# Patient Record
Sex: FEMALE | Race: WHITE | NOT HISPANIC OR LATINO | Employment: STUDENT | URBAN - METROPOLITAN AREA
[De-identification: names, ages, dates, MRNs, and addresses within clinical notes are randomized per-mention and may not be internally consistent; named-entity substitution may affect disease eponyms.]

---

## 2023-03-02 ENCOUNTER — TELEPHONE (OUTPATIENT)
Dept: NEUROLOGY | Facility: CLINIC | Age: 18
End: 2023-03-02
Payer: COMMERCIAL

## 2023-03-02 DIAGNOSIS — F07.81 POST-CONCUSSION SYNDROME: Primary | ICD-10-CM

## 2023-03-02 NOTE — TELEPHONE ENCOUNTER
----- Message from Bharath Perrin MA sent at 2/28/2023  4:48 PM CST -----  Regarding: FW: Patient call back    ----- Message -----  From: Grace Osborne MA  Sent: 2/28/2023   2:21 PM CST  To: Alena Mata, Nathalia Mack Staff  Subject: FW: Patient call back                              ----- Message -----  From: Sonal Dyer MD  Sent: 2/27/2023  11:31 AM CST  To: Grace Osborne MA  Subject: RE: Patient call back                            PCS is best served by Marcos Alena or Nathalia.  kS  ----- Message -----  From: Grace Osborne MA  Sent: 2/16/2023   1:43 PM CST  To: Sonla Dyer MD  Subject: FW: Patient call back                              ----- Message -----  From: Tasha Gray  Sent: 2/16/2023   1:08 PM CST  To: Gomez Pruitt Staff  Subject: Patient call back                                Who called:J LUIS JINA [[id number]]    What is the request in detail: Patient is requesting a call back.  She states she is having post concussion symptoms and would like to schedule an appt.   Please advise.    Can the clinic reply by [facility]? No    Best call back number: [phone]    Additional Information: N/A

## 2023-03-09 ENCOUNTER — OFFICE VISIT (OUTPATIENT)
Dept: NEUROLOGY | Facility: CLINIC | Age: 18
End: 2023-03-09
Payer: COMMERCIAL

## 2023-03-09 ENCOUNTER — CLINICAL SUPPORT (OUTPATIENT)
Dept: REHABILITATION | Facility: HOSPITAL | Age: 18
End: 2023-03-09
Attending: PSYCHIATRY & NEUROLOGY
Payer: COMMERCIAL

## 2023-03-09 VITALS
SYSTOLIC BLOOD PRESSURE: 116 MMHG | DIASTOLIC BLOOD PRESSURE: 75 MMHG | WEIGHT: 126 LBS | BODY MASS INDEX: 20.99 KG/M2 | HEART RATE: 85 BPM | HEIGHT: 65 IN

## 2023-03-09 DIAGNOSIS — H51.9 CONVERGENCE INSUFFICIENCY OR PALSY IN BINOCULAR EYE MOVEMENT: ICD-10-CM

## 2023-03-09 DIAGNOSIS — F07.81 POST-CONCUSSION SYNDROME: ICD-10-CM

## 2023-03-09 DIAGNOSIS — G31.89 COGNITIVE AND NEUROBEHAVIORAL DYSFUNCTION FOLLOWING BRAIN INJURY: ICD-10-CM

## 2023-03-09 DIAGNOSIS — F07.81 POST-CONCUSSION SYNDROME: Primary | ICD-10-CM
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
## 2023-03-09 DIAGNOSIS — F09 COGNITIVE AND NEUROBEHAVIORAL DYSFUNCTION FOLLOWING BRAIN INJURY: ICD-10-CM

## 2023-03-09 DIAGNOSIS — S06.9XAS COGNITIVE AND NEUROBEHAVIORAL DYSFUNCTION FOLLOWING BRAIN INJURY: ICD-10-CM

## 2023-03-09 DIAGNOSIS — S06.0X1A CONCUSSION WITH LOSS OF CONSCIOUSNESS OF 30 MINUTES OR LESS, INITIAL ENCOUNTER: ICD-10-CM

## 2023-03-09 DIAGNOSIS — G43.709 CHRONIC MIGRAINE WITHOUT AURA WITHOUT STATUS MIGRAINOSUS, NOT INTRACTABLE: ICD-10-CM

## 2023-03-09 DIAGNOSIS — F07.81 POST CONCUSSION SYNDROME: Primary | ICD-10-CM

## 2023-03-09 DIAGNOSIS — H81.90 VESTIBULOPATHY, UNSPECIFIED LATERALITY: ICD-10-CM

## 2023-03-09 DIAGNOSIS — G43.901 STATUS MIGRAINOSUS: ICD-10-CM

## 2023-03-09 DIAGNOSIS — G44.86 CERVICOGENIC HEADACHE: ICD-10-CM

## 2023-03-09 DIAGNOSIS — S13.4XXA WHIPLASH, INITIAL ENCOUNTER: ICD-10-CM

## 2023-03-09 DIAGNOSIS — R29.898 DECREASED ROM OF NECK: ICD-10-CM

## 2023-03-09 PROCEDURE — 1160F PR REVIEW ALL MEDS BY PRESCRIBER/CLIN PHARMACIST DOCUMENTED: ICD-10-PCS | Mod: CPTII,S$GLB,, | Performed by: PSYCHIATRY & NEUROLOGY

## 2023-03-09 PROCEDURE — 3074F SYST BP LT 130 MM HG: CPT | Mod: CPTII,S$GLB,, | Performed by: PSYCHIATRY & NEUROLOGY

## 2023-03-09 PROCEDURE — 3078F DIAST BP <80 MM HG: CPT | Mod: CPTII,S$GLB,, | Performed by: PSYCHIATRY & NEUROLOGY

## 2023-03-09 PROCEDURE — 99205 PR OFFICE/OUTPT VISIT, NEW, LEVL V, 60-74 MIN: ICD-10-PCS | Mod: S$GLB,,, | Performed by: PSYCHIATRY & NEUROLOGY

## 2023-03-09 PROCEDURE — 99999 PR PBB SHADOW E&M-EST. PATIENT-LVL III: ICD-10-PCS | Mod: PBBFAC,,,

## 2023-03-09 PROCEDURE — 1160F RVW MEDS BY RX/DR IN RCRD: CPT | Mod: CPTII,S$GLB,, | Performed by: PSYCHIATRY & NEUROLOGY

## 2023-03-09 PROCEDURE — 3008F PR BODY MASS INDEX (BMI) DOCUMENTED: ICD-10-PCS | Mod: CPTII,S$GLB,, | Performed by: PSYCHIATRY & NEUROLOGY

## 2023-03-09 PROCEDURE — 3008F BODY MASS INDEX DOCD: CPT | Mod: CPTII,S$GLB,, | Performed by: PSYCHIATRY & NEUROLOGY

## 2023-03-09 PROCEDURE — 1159F MED LIST DOCD IN RCRD: CPT | Mod: CPTII,S$GLB,, | Performed by: PSYCHIATRY & NEUROLOGY

## 2023-03-09 PROCEDURE — 3078F PR MOST RECENT DIASTOLIC BLOOD PRESSURE < 80 MM HG: ICD-10-PCS | Mod: CPTII,S$GLB,, | Performed by: PSYCHIATRY & NEUROLOGY

## 2023-03-09 PROCEDURE — 99999 PR PBB SHADOW E&M-EST. PATIENT-LVL III: CPT | Mod: PBBFAC,,,

## 2023-03-09 PROCEDURE — 97161 PT EVAL LOW COMPLEX 20 MIN: CPT | Performed by: PHYSICAL THERAPIST

## 2023-03-09 PROCEDURE — 1159F PR MEDICATION LIST DOCUMENTED IN MEDICAL RECORD: ICD-10-PCS | Mod: CPTII,S$GLB,, | Performed by: PSYCHIATRY & NEUROLOGY

## 2023-03-09 PROCEDURE — 97110 THERAPEUTIC EXERCISES: CPT | Performed by: PHYSICAL THERAPIST

## 2023-03-09 PROCEDURE — 3074F PR MOST RECENT SYSTOLIC BLOOD PRESSURE < 130 MM HG: ICD-10-PCS | Mod: CPTII,S$GLB,, | Performed by: PSYCHIATRY & NEUROLOGY

## 2023-03-09 PROCEDURE — 97530 THERAPEUTIC ACTIVITIES: CPT

## 2023-03-09 PROCEDURE — 99205 OFFICE O/P NEW HI 60 MIN: CPT | Mod: S$GLB,,, | Performed by: PSYCHIATRY & NEUROLOGY

## 2023-03-09 PROCEDURE — 97165 OT EVAL LOW COMPLEX 30 MIN: CPT

## 2023-03-09 RX ORDER — SUMATRIPTAN 20 MG/1
SPRAY NASAL
COMMUNITY
Start: 2023-02-26 | End: 2023-03-18 | Stop reason: SDUPTHER

## 2023-03-09 RX ORDER — PREDNISONE 5 MG/1
TABLET ORAL
Qty: 21 TABLET | Refills: 0 | Status: SHIPPED | OUTPATIENT
Start: 2023-03-09 | End: 2023-03-15

## 2023-03-09 RX ORDER — SUMATRIPTAN 50 MG/1
50 TABLET, FILM COATED ORAL
COMMUNITY
End: 2023-03-20
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
## 2023-03-09 NOTE — PROGRESS NOTES
See plan of care for physical therapy evaluation    Chelo Sanchez, PT, DPT,   Board-Certified Clinical Specialist in Neurologic Physical Therapy   Certified Brain Injury Specialist
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
## 2023-03-09 NOTE — PATIENT INSTRUCTIONS
Choose 3 targets in the distance (alternate between targets that are large and small) and change gaze between all 3, as fast as you can but ensuring that each comes into focus.     Hold a near target (about arms length) in front of you and choose a far target. Change gaze between near and far targets as fast as you can but ensuring that each comes into focus.     Perform each of these 3-4 times a day for at least a minute at a time.         Retrieved from:   [email or web address]/hcp-hs-yshhg-digital-eye-strain/    To help avoid workplace dry eye and eye strain, follow these eye ergonomics tips from the American Academy of Ophthalmology:    - Stay at arms length: The eyes actually have to work harder to see close up than far away. If you have a desktop computer, try placing the monitor 25 inches away from your face. No measuring tape? Put your screen an arms length away. You may need to adjust the type to appear larger at that distance.  - Take care of glare: While many new phones and laptops have glass screens with excellent picture quality, they also produce a strong glare that can aggravate the eyes. If you use a glass screen device, try a matte filter for your screen.  - Give your eyes a break: Just as carpal tunnel syndrome from overuse can hurt your wrists, eye strain occurs after long, continuous periods of reading paper or viewing digital screens up close. Follow the 20-20-20 rule: take a break every 20 minutes by looking at an object 20 feet away for 20 seconds. Looking into the distance allows your eyes to relax.  - Defy dry eye: Many newer office buildings have humidity-controlled environments that suck moisture out of the air. In winter, heaters on high can further dry your eyes. Try a desktop humidifier to add localized moisture.[3] Keep artificial tears at hand to help lubricate your eyes.  - Lighten up: When your screen is much brighter than your surroundings, your eyes have to work harder  to see. Adjusting your environmental lighting can reduce eye strain. Also, try increasing the contrast on your monitor.    Retrieved from: https://www.aao.org/newsroom/news-releases/detail/ergonomics-eyes-tips-avoid-computer-eye-strai#_edn3    *Keep a journal about how long you are tolerating screen time. Gradually progress/increase time intervals and number of times you use the computer a day. Make sure progressions are easy for at least 3 days before you make another progression. Continue to progress until you return to prior level of function.
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
## 2023-03-14 ENCOUNTER — HOSPITAL ENCOUNTER (EMERGENCY)
Facility: OTHER | Age: 18
Discharge: HOME | End: 2023-03-14
Attending: EMERGENCY MEDICINE
Payer: COMMERCIAL

## 2023-03-14 VITALS
BODY MASS INDEX: 20.83 KG/M2 | TEMPERATURE: 98 F | HEIGHT: 65 IN | RESPIRATION RATE: 18 BRPM | SYSTOLIC BLOOD PRESSURE: 105 MMHG | WEIGHT: 125 LBS | DIASTOLIC BLOOD PRESSURE: 67 MMHG | HEART RATE: 72 BPM | OXYGEN SATURATION: 98 %

## 2023-03-14 DIAGNOSIS — R51.9 CHRONIC NONINTRACTABLE HEADACHE, UNSPECIFIED HEADACHE TYPE: ICD-10-CM

## 2023-03-14 DIAGNOSIS — G89.29 CHRONIC NONINTRACTABLE HEADACHE, UNSPECIFIED HEADACHE TYPE: ICD-10-CM

## 2023-03-14 DIAGNOSIS — F07.81 POST CONCUSSION SYNDROME: Primary | ICD-10-CM

## 2023-03-14 LAB
AMORPH CRY URNS QL MICRO: ABNORMAL
B-HCG UR QL: NEGATIVE
BACTERIA #/AREA URNS HPF: ABNORMAL /HPF
BILIRUB UR QL STRIP: NEGATIVE
CLARITY UR: CLEAR
COLOR UR: YELLOW
CTP QC/QA: YES
GLUCOSE UR QL STRIP: NEGATIVE
HGB UR QL STRIP: NEGATIVE
HYALINE CASTS #/AREA URNS LPF: 1 /LPF
KETONES UR QL STRIP: ABNORMAL
LEUKOCYTE ESTERASE UR QL STRIP: NEGATIVE
MICROSCOPIC COMMENT: ABNORMAL
NITRITE UR QL STRIP: NEGATIVE
PH UR STRIP: 5 [PH] (ref 5–8)
PROT UR QL STRIP: ABNORMAL
RBC #/AREA URNS HPF: 3 /HPF (ref 0–4)
SP GR UR STRIP: >1.03 (ref 1–1.03)
SQUAMOUS #/AREA URNS HPF: 1 /HPF
URN SPEC COLLECT METH UR: ABNORMAL
UROBILINOGEN UR STRIP-ACNC: NEGATIVE EU/DL
WBC #/AREA URNS HPF: 2 /HPF (ref 0–5)

## 2023-03-14 PROCEDURE — 96375 TX/PRO/DX INJ NEW DRUG ADDON: CPT

## 2023-03-14 PROCEDURE — 96372 THER/PROPH/DIAG INJ SC/IM: CPT | Mod: 59 | Performed by: NURSE PRACTITIONER

## 2023-03-14 PROCEDURE — 81000 URINALYSIS NONAUTO W/SCOPE: CPT | Performed by: NURSE PRACTITIONER

## 2023-03-14 PROCEDURE — 63600175 PHARM REV CODE 636 W HCPCS: Performed by: PHYSICIAN ASSISTANT

## 2023-03-14 PROCEDURE — 96374 THER/PROPH/DIAG INJ IV PUSH: CPT

## 2023-03-14 PROCEDURE — 96361 HYDRATE IV INFUSION ADD-ON: CPT

## 2023-03-14 PROCEDURE — 81025 URINE PREGNANCY TEST: CPT | Performed by: NURSE PRACTITIONER

## 2023-03-14 PROCEDURE — 25000003 PHARM REV CODE 250: Performed by: NURSE PRACTITIONER

## 2023-03-14 PROCEDURE — 25000003 PHARM REV CODE 250: Performed by: PHYSICIAN ASSISTANT

## 2023-03-14 PROCEDURE — 99284 EMERGENCY DEPT VISIT MOD MDM: CPT | Mod: 25

## 2023-03-14 PROCEDURE — 63600175 PHARM REV CODE 636 W HCPCS: Performed by: NURSE PRACTITIONER

## 2023-03-14 RX ORDER — METOCLOPRAMIDE HYDROCHLORIDE 5 MG/ML
5 INJECTION INTRAMUSCULAR; INTRAVENOUS
Status: COMPLETED | OUTPATIENT
Start: 2023-03-14 | End: 2023-03-14

## 2023-03-14 RX ORDER — DIPHENHYDRAMINE HCL 25 MG
25 CAPSULE ORAL
Status: COMPLETED | OUTPATIENT
Start: 2023-03-14 | End: 2023-03-14

## 2023-03-14 RX ORDER — DIPHENHYDRAMINE HYDROCHLORIDE 50 MG/ML
12.5 INJECTION INTRAMUSCULAR; INTRAVENOUS
Status: COMPLETED | OUTPATIENT
Start: 2023-03-14 | End: 2023-03-14

## 2023-03-14 RX ORDER — KETOROLAC TROMETHAMINE 30 MG/ML
30 INJECTION, SOLUTION INTRAMUSCULAR; INTRAVENOUS
Status: COMPLETED | OUTPATIENT
Start: 2023-03-14 | End: 2023-03-14

## 2023-03-14 RX ORDER — METHOCARBAMOL 750 MG/1
1500 TABLET, FILM COATED ORAL 3 TIMES DAILY
Qty: 30 TABLET | Refills: 0 | Status: SHIPPED | OUTPATIENT
Start: 2023-03-14 | End: 2023-03-19

## 2023-03-14 RX ORDER — PROMETHAZINE HYDROCHLORIDE 25 MG/1
25 TABLET ORAL EVERY 6 HOURS PRN
Qty: 15 TABLET | Refills: 0 | Status: SHIPPED | OUTPATIENT
Start: 2023-03-14 | End: 2023-03-20

## 2023-03-14 RX ORDER — LIDOCAINE 50 MG/G
1 PATCH TOPICAL DAILY
Qty: 15 PATCH | Refills: 0 | Status: SHIPPED | OUTPATIENT
Start: 2023-03-14 | End: 2023-03-20

## 2023-03-14 RX ADMIN — METOCLOPRAMIDE 5 MG: 5 INJECTION, SOLUTION INTRAMUSCULAR; INTRAVENOUS at 12:03

## 2023-03-14 RX ADMIN — DIPHENHYDRAMINE HYDROCHLORIDE 25 MG: 25 CAPSULE ORAL at 11:03

## 2023-03-14 RX ADMIN — SODIUM CHLORIDE 1000 ML: 9 INJECTION, SOLUTION INTRAVENOUS at 12:03

## 2023-03-14 RX ADMIN — KETOROLAC TROMETHAMINE 30 MG: 30 INJECTION, SOLUTION INTRAMUSCULAR; INTRAVENOUS at 11:03

## 2023-03-14 RX ADMIN — DIPHENHYDRAMINE HYDROCHLORIDE 12.5 MG: 50 INJECTION, SOLUTION INTRAMUSCULAR; INTRAVENOUS at 12:03
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
## 2023-03-14 NOTE — Clinical Note
"Tran Crowepolo Saab was seen and treated in our emergency department on 3/14/2023.  She may return to school on 03/17/2023.      If you have any questions or concerns, please don't hesitate to call.      BILLY Joiner"
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
## 2023-03-14 NOTE — ED TRIAGE NOTES
Pt reports headache, n/v, history of post concussive syndrome. Pt is alert and oriented, ambulatory, respirations are even unlabored.

## 2023-03-17 ENCOUNTER — HOSPITAL ENCOUNTER (OUTPATIENT)
Facility: HOSPITAL | Age: 18
Discharge: LEFT BEFORE DISCHARGE | End: 2023-03-19
Attending: EMERGENCY MEDICINE | Admitting: HOSPITALIST
Payer: COMMERCIAL

## 2023-03-17 DIAGNOSIS — M54.2 NECK PAIN: ICD-10-CM

## 2023-03-17 DIAGNOSIS — R50.9 FEVER, UNSPECIFIED FEVER CAUSE: Primary | ICD-10-CM

## 2023-03-17 DIAGNOSIS — R51.9 HEADACHE: ICD-10-CM

## 2023-03-17 LAB
ALBUMIN SERPL BCP-MCNC: 3.8 G/DL (ref 3.5–5.2)
ALP SERPL-CCNC: 47 U/L (ref 55–135)
ALT SERPL W/O P-5'-P-CCNC: 12 U/L (ref 10–44)
ANION GAP SERPL CALC-SCNC: 11 MMOL/L (ref 8–16)
AST SERPL-CCNC: 21 U/L (ref 10–40)
B-HCG UR QL: NEGATIVE
BACTERIA #/AREA URNS HPF: ABNORMAL /HPF
BASOPHILS # BLD AUTO: 0 K/UL (ref 0–0.2)
BASOPHILS NFR BLD: 0 % (ref 0–1.9)
BILIRUB SERPL-MCNC: 0.3 MG/DL (ref 0.1–1)
BILIRUB UR QL STRIP: NEGATIVE
BUN SERPL-MCNC: 11 MG/DL (ref 6–20)
CALCIUM SERPL-MCNC: 9 MG/DL (ref 8.7–10.5)
CHLORIDE SERPL-SCNC: 101 MMOL/L (ref 95–110)
CLARITY UR: CLEAR
CO2 SERPL-SCNC: 24 MMOL/L (ref 23–29)
COLOR UR: YELLOW
CREAT SERPL-MCNC: 0.8 MG/DL (ref 0.5–1.4)
CTP QC/QA: YES
DIFFERENTIAL METHOD: ABNORMAL
EOSINOPHIL # BLD AUTO: 0 K/UL (ref 0–0.5)
EOSINOPHIL NFR BLD: 0 % (ref 0–8)
ERYTHROCYTE [DISTWIDTH] IN BLOOD BY AUTOMATED COUNT: 12.7 % (ref 11.5–14.5)
EST. GFR  (NO RACE VARIABLE): >60 ML/MIN/1.73 M^2
GLUCOSE SERPL-MCNC: 95 MG/DL (ref 70–110)
GLUCOSE UR QL STRIP: NEGATIVE
HCT VFR BLD AUTO: 38.6 % (ref 37–48.5)
HGB BLD-MCNC: 13.3 G/DL (ref 12–16)
HGB UR QL STRIP: ABNORMAL
HYALINE CASTS #/AREA URNS LPF: 0 /LPF
IMM GRANULOCYTES # BLD AUTO: 0.01 K/UL (ref 0–0.04)
IMM GRANULOCYTES NFR BLD AUTO: 0.2 % (ref 0–0.5)
KETONES UR QL STRIP: ABNORMAL
LEUKOCYTE ESTERASE UR QL STRIP: ABNORMAL
LYMPHOCYTES # BLD AUTO: 1.2 K/UL (ref 1–4.8)
LYMPHOCYTES NFR BLD: 26 % (ref 18–48)
MCH RBC QN AUTO: 30.2 PG (ref 27–31)
MCHC RBC AUTO-ENTMCNC: 34.5 G/DL (ref 32–36)
MCV RBC AUTO: 88 FL (ref 82–98)
MICROSCOPIC COMMENT: ABNORMAL
MOLECULAR STREP A: NEGATIVE
MONOCYTES # BLD AUTO: 0.4 K/UL (ref 0.3–1)
MONOCYTES NFR BLD: 8.9 % (ref 4–15)
NEUTROPHILS # BLD AUTO: 2.9 K/UL (ref 1.8–7.7)
NEUTROPHILS NFR BLD: 64.9 % (ref 38–73)
NITRITE UR QL STRIP: NEGATIVE
NRBC BLD-RTO: 0 /100 WBC
PH UR STRIP: 6 [PH] (ref 5–8)
PLATELET # BLD AUTO: 97 K/UL (ref 150–450)
PMV BLD AUTO: 10.1 FL (ref 9.2–12.9)
POC MOLECULAR INFLUENZA A AGN: NEGATIVE
POC MOLECULAR INFLUENZA B AGN: NEGATIVE
POTASSIUM SERPL-SCNC: 3.4 MMOL/L (ref 3.5–5.1)
PROT SERPL-MCNC: 7.5 G/DL (ref 6–8.4)
PROT UR QL STRIP: ABNORMAL
RBC # BLD AUTO: 4.41 M/UL (ref 4–5.4)
RBC #/AREA URNS HPF: 3 /HPF (ref 0–4)
SARS-COV-2 RDRP RESP QL NAA+PROBE: NEGATIVE
SODIUM SERPL-SCNC: 136 MMOL/L (ref 136–145)
SP GR UR STRIP: >1.03 (ref 1–1.03)
URN SPEC COLLECT METH UR: ABNORMAL
UROBILINOGEN UR STRIP-ACNC: ABNORMAL EU/DL
WBC # BLD AUTO: 4.5 K/UL (ref 3.9–12.7)
WBC #/AREA URNS HPF: 12 /HPF (ref 0–5)

## 2023-03-17 PROCEDURE — 96365 THER/PROPH/DIAG IV INF INIT: CPT | Mod: 59

## 2023-03-17 PROCEDURE — 81025 URINE PREGNANCY TEST: CPT

## 2023-03-17 PROCEDURE — 80053 COMPREHEN METABOLIC PANEL: CPT | Performed by: EMERGENCY MEDICINE

## 2023-03-17 PROCEDURE — 87086 URINE CULTURE/COLONY COUNT: CPT

## 2023-03-17 PROCEDURE — 87502 INFLUENZA DNA AMP PROBE: CPT

## 2023-03-17 PROCEDURE — 99285 EMERGENCY DEPT VISIT HI MDM: CPT | Mod: 25

## 2023-03-17 PROCEDURE — 81000 URINALYSIS NONAUTO W/SCOPE: CPT

## 2023-03-17 PROCEDURE — 87040 BLOOD CULTURE FOR BACTERIA: CPT | Mod: 59 | Performed by: EMERGENCY MEDICINE

## 2023-03-17 PROCEDURE — 25000003 PHARM REV CODE 250: Performed by: EMERGENCY MEDICINE

## 2023-03-17 PROCEDURE — 96375 TX/PRO/DX INJ NEW DRUG ADDON: CPT

## 2023-03-17 PROCEDURE — 85025 COMPLETE CBC W/AUTO DIFF WBC: CPT | Performed by: EMERGENCY MEDICINE

## 2023-03-17 PROCEDURE — 87651 STREP A DNA AMP PROBE: CPT

## 2023-03-17 PROCEDURE — 63600175 PHARM REV CODE 636 W HCPCS: Mod: TB,JG | Performed by: EMERGENCY MEDICINE

## 2023-03-17 RX ORDER — KETOROLAC TROMETHAMINE 30 MG/ML
15 INJECTION, SOLUTION INTRAMUSCULAR; INTRAVENOUS
Status: COMPLETED | OUTPATIENT
Start: 2023-03-17 | End: 2023-03-17

## 2023-03-17 RX ORDER — CEFTRIAXONE 2 G/50ML
2 INJECTION, SOLUTION INTRAVENOUS ONCE
Status: COMPLETED | OUTPATIENT
Start: 2023-03-17 | End: 2023-03-18

## 2023-03-17 RX ORDER — LIDOCAINE 50 MG/G
1 PATCH TOPICAL ONCE
Status: COMPLETED | OUTPATIENT
Start: 2023-03-17 | End: 2023-03-18

## 2023-03-17 RX ORDER — MORPHINE SULFATE 4 MG/ML
4 INJECTION, SOLUTION INTRAMUSCULAR; INTRAVENOUS
Status: COMPLETED | OUTPATIENT
Start: 2023-03-17 | End: 2023-03-17

## 2023-03-17 RX ORDER — ACETAMINOPHEN 325 MG/1
650 TABLET ORAL
Status: COMPLETED | OUTPATIENT
Start: 2023-03-18 | End: 2023-03-18

## 2023-03-17 RX ORDER — LIDOCAINE HYDROCHLORIDE 10 MG/ML
5 INJECTION INFILTRATION; PERINEURAL
Status: COMPLETED | OUTPATIENT
Start: 2023-03-18 | End: 2023-03-17

## 2023-03-17 RX ORDER — DEXAMETHASONE SODIUM PHOSPHATE 4 MG/ML
8 INJECTION, SOLUTION INTRA-ARTICULAR; INTRALESIONAL; INTRAMUSCULAR; INTRAVENOUS; SOFT TISSUE
Status: COMPLETED | OUTPATIENT
Start: 2023-03-17 | End: 2023-03-17

## 2023-03-17 RX ADMIN — VANCOMYCIN HYDROCHLORIDE 1250 MG: 1.25 INJECTION, POWDER, LYOPHILIZED, FOR SOLUTION INTRAVENOUS at 10:03

## 2023-03-17 RX ADMIN — KETOROLAC TROMETHAMINE 15 MG: 30 INJECTION, SOLUTION INTRAMUSCULAR; INTRAVENOUS at 10:03

## 2023-03-17 RX ADMIN — MORPHINE SULFATE 4 MG: 4 INJECTION, SOLUTION INTRAMUSCULAR; INTRAVENOUS at 10:03

## 2023-03-17 RX ADMIN — DEXAMETHASONE SODIUM PHOSPHATE 8 MG: 4 INJECTION INTRA-ARTICULAR; INTRALESIONAL; INTRAMUSCULAR; INTRAVENOUS; SOFT TISSUE at 10:03

## 2023-03-17 RX ADMIN — LIDOCAINE 1 PATCH: 50 PATCH TOPICAL at 10:03

## 2023-03-17 RX ADMIN — LIDOCAINE HYDROCHLORIDE 5 ML: 10 INJECTION, SOLUTION INFILTRATION; PERINEURAL at 11:03

## 2023-03-18 PROBLEM — R51.9 HEADACHE: Status: ACTIVE | Noted: 2023-03-18

## 2023-03-18 PROBLEM — M43.6 NECK STIFFNESS: Status: ACTIVE | Noted: 2023-03-18

## 2023-03-18 PROBLEM — E87.6 HYPOKALEMIA: Status: ACTIVE | Noted: 2023-03-18

## 2023-03-18 PROBLEM — R29.1: Status: ACTIVE | Noted: 2023-03-18

## 2023-03-18 PROBLEM — R50.9 FEVER: Status: ACTIVE | Noted: 2023-03-18

## 2023-03-18 LAB
ALBUMIN SERPL BCP-MCNC: 3 G/DL (ref 3.5–5.2)
ALP SERPL-CCNC: 37 U/L (ref 55–135)
ALT SERPL W/O P-5'-P-CCNC: 10 U/L (ref 10–44)
AMPHET+METHAMPHET UR QL: NEGATIVE
ANION GAP SERPL CALC-SCNC: 11 MMOL/L (ref 8–16)
AST SERPL-CCNC: 16 U/L (ref 10–40)
BARBITURATES UR QL SCN>200 NG/ML: NEGATIVE
BASOPHILS # BLD AUTO: 0 K/UL (ref 0–0.2)
BASOPHILS NFR BLD: 0 % (ref 0–1.9)
BENZODIAZ UR QL SCN>200 NG/ML: NEGATIVE
BILIRUB SERPL-MCNC: 0.2 MG/DL (ref 0.1–1)
BUN SERPL-MCNC: 9 MG/DL (ref 6–20)
BZE UR QL SCN: NEGATIVE
CALCIUM SERPL-MCNC: 7.8 MG/DL (ref 8.7–10.5)
CANNABINOIDS UR QL SCN: NEGATIVE
CHLORIDE SERPL-SCNC: 106 MMOL/L (ref 95–110)
CK SERPL-CCNC: 60 U/L (ref 20–180)
CO2 SERPL-SCNC: 22 MMOL/L (ref 23–29)
CREAT SERPL-MCNC: 0.7 MG/DL (ref 0.5–1.4)
CREAT UR-MCNC: 37.3 MG/DL (ref 15–325)
CRP SERPL-MCNC: 27.8 MG/L (ref 0–8.2)
D DIMER PPP IA.FEU-MCNC: 0.3 MG/L FEU
DIFFERENTIAL METHOD: ABNORMAL
EOSINOPHIL # BLD AUTO: 0 K/UL (ref 0–0.5)
EOSINOPHIL NFR BLD: 0 % (ref 0–8)
ERYTHROCYTE [DISTWIDTH] IN BLOOD BY AUTOMATED COUNT: 13 % (ref 11.5–14.5)
ERYTHROCYTE [SEDIMENTATION RATE] IN BLOOD BY WESTERGREN METHOD: 35 MM/HR (ref 0–20)
EST. GFR  (NO RACE VARIABLE): >60 ML/MIN/1.73 M^2
GLUCOSE SERPL-MCNC: 128 MG/DL (ref 70–110)
HCT VFR BLD AUTO: 31.8 % (ref 37–48.5)
HETEROPH AB SERPL QL IA: NEGATIVE
HGB BLD-MCNC: 10.5 G/DL (ref 12–16)
HIV 1+2 AB+HIV1 P24 AG SERPL QL IA: NORMAL
IMM GRANULOCYTES # BLD AUTO: 0.01 K/UL (ref 0–0.04)
IMM GRANULOCYTES NFR BLD AUTO: 0.2 % (ref 0–0.5)
LACTATE SERPL-SCNC: 0.5 MMOL/L (ref 0.5–2.2)
LYMPHOCYTES # BLD AUTO: 1.1 K/UL (ref 1–4.8)
LYMPHOCYTES NFR BLD: 24.6 % (ref 18–48)
MAGNESIUM SERPL-MCNC: 2 MG/DL (ref 1.6–2.6)
MCH RBC QN AUTO: 30.3 PG (ref 27–31)
MCHC RBC AUTO-ENTMCNC: 33 G/DL (ref 32–36)
MCV RBC AUTO: 92 FL (ref 82–98)
METHADONE UR QL SCN>300 NG/ML: NEGATIVE
MONOCYTES # BLD AUTO: 0.2 K/UL (ref 0.3–1)
MONOCYTES NFR BLD: 4.4 % (ref 4–15)
NEUTROPHILS # BLD AUTO: 3.1 K/UL (ref 1.8–7.7)
NEUTROPHILS NFR BLD: 70.8 % (ref 38–73)
NRBC BLD-RTO: 0 /100 WBC
OPIATES UR QL SCN: NEGATIVE
PCP UR QL SCN>25 NG/ML: NEGATIVE
PHOSPHATE SERPL-MCNC: 4.2 MG/DL (ref 2.7–4.5)
PLATELET # BLD AUTO: 86 K/UL (ref 150–450)
PMV BLD AUTO: 11.1 FL (ref 9.2–12.9)
POTASSIUM SERPL-SCNC: 3.8 MMOL/L (ref 3.5–5.1)
PROCALCITONIN SERPL IA-MCNC: 0.06 NG/ML
PROT SERPL-MCNC: 6 G/DL (ref 6–8.4)
RBC # BLD AUTO: 3.47 M/UL (ref 4–5.4)
SODIUM SERPL-SCNC: 139 MMOL/L (ref 136–145)
TOXICOLOGY INFORMATION: NORMAL
WBC # BLD AUTO: 4.35 K/UL (ref 3.9–12.7)

## 2023-03-18 PROCEDURE — 96367 TX/PROPH/DG ADDL SEQ IV INF: CPT

## 2023-03-18 PROCEDURE — 63600175 PHARM REV CODE 636 W HCPCS: Performed by: HOSPITALIST

## 2023-03-18 PROCEDURE — 25500020 PHARM REV CODE 255: Performed by: STUDENT IN AN ORGANIZED HEALTH CARE EDUCATION/TRAINING PROGRAM

## 2023-03-18 PROCEDURE — 25000003 PHARM REV CODE 250: Performed by: EMERGENCY MEDICINE

## 2023-03-18 PROCEDURE — 80053 COMPREHEN METABOLIC PANEL: CPT | Performed by: NURSE PRACTITIONER

## 2023-03-18 PROCEDURE — 63600175 PHARM REV CODE 636 W HCPCS: Performed by: NURSE PRACTITIONER

## 2023-03-18 PROCEDURE — G0378 HOSPITAL OBSERVATION PER HR: HCPCS

## 2023-03-18 PROCEDURE — 96366 THER/PROPH/DIAG IV INF ADDON: CPT | Mod: 59

## 2023-03-18 PROCEDURE — 87389 HIV-1 AG W/HIV-1&-2 AB AG IA: CPT | Performed by: STUDENT IN AN ORGANIZED HEALTH CARE EDUCATION/TRAINING PROGRAM

## 2023-03-18 PROCEDURE — 36415 COLL VENOUS BLD VENIPUNCTURE: CPT | Performed by: NURSE PRACTITIONER

## 2023-03-18 PROCEDURE — 86308 HETEROPHILE ANTIBODY SCREEN: CPT | Performed by: EMERGENCY MEDICINE

## 2023-03-18 PROCEDURE — 86140 C-REACTIVE PROTEIN: CPT | Performed by: STUDENT IN AN ORGANIZED HEALTH CARE EDUCATION/TRAINING PROGRAM

## 2023-03-18 PROCEDURE — A9585 GADOBUTROL INJECTION: HCPCS | Performed by: STUDENT IN AN ORGANIZED HEALTH CARE EDUCATION/TRAINING PROGRAM

## 2023-03-18 PROCEDURE — 82550 ASSAY OF CK (CPK): CPT | Performed by: STUDENT IN AN ORGANIZED HEALTH CARE EDUCATION/TRAINING PROGRAM

## 2023-03-18 PROCEDURE — 96361 HYDRATE IV INFUSION ADD-ON: CPT | Mod: 59

## 2023-03-18 PROCEDURE — 80307 DRUG TEST PRSMV CHEM ANLYZR: CPT | Performed by: STUDENT IN AN ORGANIZED HEALTH CARE EDUCATION/TRAINING PROGRAM

## 2023-03-18 PROCEDURE — 83735 ASSAY OF MAGNESIUM: CPT | Performed by: NURSE PRACTITIONER

## 2023-03-18 PROCEDURE — 36415 COLL VENOUS BLD VENIPUNCTURE: CPT | Performed by: STUDENT IN AN ORGANIZED HEALTH CARE EDUCATION/TRAINING PROGRAM

## 2023-03-18 PROCEDURE — 85379 FIBRIN DEGRADATION QUANT: CPT | Performed by: STUDENT IN AN ORGANIZED HEALTH CARE EDUCATION/TRAINING PROGRAM

## 2023-03-18 PROCEDURE — 25000003 PHARM REV CODE 250: Performed by: HOSPITALIST

## 2023-03-18 PROCEDURE — 85652 RBC SED RATE AUTOMATED: CPT | Performed by: STUDENT IN AN ORGANIZED HEALTH CARE EDUCATION/TRAINING PROGRAM

## 2023-03-18 PROCEDURE — 62270 DX LMBR SPI PNXR: CPT

## 2023-03-18 PROCEDURE — 25000003 PHARM REV CODE 250: Performed by: NURSE PRACTITIONER

## 2023-03-18 PROCEDURE — 84100 ASSAY OF PHOSPHORUS: CPT | Performed by: NURSE PRACTITIONER

## 2023-03-18 PROCEDURE — 99222 1ST HOSP IP/OBS MODERATE 55: CPT | Mod: ,,, | Performed by: PSYCHIATRY & NEUROLOGY

## 2023-03-18 PROCEDURE — 63600175 PHARM REV CODE 636 W HCPCS: Performed by: EMERGENCY MEDICINE

## 2023-03-18 PROCEDURE — 85025 COMPLETE CBC W/AUTO DIFF WBC: CPT | Performed by: NURSE PRACTITIONER

## 2023-03-18 PROCEDURE — 84145 PROCALCITONIN (PCT): CPT | Performed by: STUDENT IN AN ORGANIZED HEALTH CARE EDUCATION/TRAINING PROGRAM

## 2023-03-18 PROCEDURE — 63600175 PHARM REV CODE 636 W HCPCS: Performed by: STUDENT IN AN ORGANIZED HEALTH CARE EDUCATION/TRAINING PROGRAM

## 2023-03-18 PROCEDURE — 99222 PR INITIAL HOSPITAL CARE,LEVL II: ICD-10-PCS | Mod: ,,, | Performed by: PSYCHIATRY & NEUROLOGY

## 2023-03-18 PROCEDURE — 83605 ASSAY OF LACTIC ACID: CPT | Performed by: EMERGENCY MEDICINE

## 2023-03-18 RX ORDER — ACETAMINOPHEN 325 MG/1
650 TABLET ORAL EVERY 6 HOURS PRN
Status: DISCONTINUED | OUTPATIENT
Start: 2023-03-18 | End: 2023-03-20 | Stop reason: HOSPADM

## 2023-03-18 RX ORDER — CEFTRIAXONE 2 G/50ML
2 INJECTION, SOLUTION INTRAVENOUS
Status: DISCONTINUED | OUTPATIENT
Start: 2023-03-18 | End: 2023-03-20 | Stop reason: HOSPADM

## 2023-03-18 RX ORDER — SODIUM CHLORIDE 9 MG/ML
INJECTION, SOLUTION INTRAVENOUS CONTINUOUS
Status: DISCONTINUED | OUTPATIENT
Start: 2023-03-18 | End: 2023-03-19

## 2023-03-18 RX ORDER — MORPHINE SULFATE 4 MG/ML
2 INJECTION, SOLUTION INTRAMUSCULAR; INTRAVENOUS EVERY 6 HOURS PRN
Status: DISCONTINUED | OUTPATIENT
Start: 2023-03-18 | End: 2023-03-20 | Stop reason: HOSPADM

## 2023-03-18 RX ORDER — TALC
6 POWDER (GRAM) TOPICAL NIGHTLY PRN
Status: DISCONTINUED | OUTPATIENT
Start: 2023-03-18 | End: 2023-03-20 | Stop reason: HOSPADM

## 2023-03-18 RX ORDER — POTASSIUM CHLORIDE 20 MEQ/1
40 TABLET, EXTENDED RELEASE ORAL ONCE
Status: COMPLETED | OUTPATIENT
Start: 2023-03-18 | End: 2023-03-18

## 2023-03-18 RX ORDER — GADOBUTROL 604.72 MG/ML
6 INJECTION INTRAVENOUS
Status: COMPLETED | OUTPATIENT
Start: 2023-03-18 | End: 2023-03-18

## 2023-03-18 RX ORDER — KETOROLAC TROMETHAMINE 30 MG/ML
15 INJECTION, SOLUTION INTRAMUSCULAR; INTRAVENOUS EVERY 6 HOURS PRN
Status: DISCONTINUED | OUTPATIENT
Start: 2023-03-18 | End: 2023-03-20 | Stop reason: HOSPADM

## 2023-03-18 RX ORDER — MAG HYDROX/ALUMINUM HYD/SIMETH 200-200-20
30 SUSPENSION, ORAL (FINAL DOSE FORM) ORAL EVERY 6 HOURS PRN
Status: DISCONTINUED | OUTPATIENT
Start: 2023-03-18 | End: 2023-03-20 | Stop reason: HOSPADM

## 2023-03-18 RX ORDER — ONDANSETRON 2 MG/ML
4 INJECTION INTRAMUSCULAR; INTRAVENOUS EVERY 6 HOURS PRN
Status: DISCONTINUED | OUTPATIENT
Start: 2023-03-18 | End: 2023-03-20 | Stop reason: HOSPADM

## 2023-03-18 RX ORDER — AMOXICILLIN 250 MG
1 CAPSULE ORAL DAILY PRN
Status: DISCONTINUED | OUTPATIENT
Start: 2023-03-18 | End: 2023-03-20 | Stop reason: HOSPADM

## 2023-03-18 RX ADMIN — VANCOMYCIN HYDROCHLORIDE 1000 MG: 1 INJECTION, POWDER, LYOPHILIZED, FOR SOLUTION INTRAVENOUS at 10:03

## 2023-03-18 RX ADMIN — ACETAMINOPHEN 650 MG: 325 TABLET ORAL at 12:03

## 2023-03-18 RX ADMIN — SODIUM CHLORIDE: 9 INJECTION, SOLUTION INTRAVENOUS at 08:03

## 2023-03-18 RX ADMIN — ALUMINUM HYDROXIDE, MAGNESIUM HYDROXIDE, AND SIMETHICONE 30 ML: 200; 200; 20 SUSPENSION ORAL at 08:03

## 2023-03-18 RX ADMIN — CEFTRIAXONE 2 G: 2 INJECTION, SOLUTION INTRAVENOUS at 12:03

## 2023-03-18 RX ADMIN — GADOBUTROL 6 ML: 604.72 INJECTION INTRAVENOUS at 03:03

## 2023-03-18 RX ADMIN — CEFTRIAXONE 2 G: 2 INJECTION, SOLUTION INTRAVENOUS at 08:03

## 2023-03-18 RX ADMIN — SODIUM CHLORIDE: 9 INJECTION, SOLUTION INTRAVENOUS at 03:03

## 2023-03-18 RX ADMIN — SODIUM CHLORIDE, POTASSIUM CHLORIDE, SODIUM LACTATE AND CALCIUM CHLORIDE 1000 ML: 600; 310; 30; 20 INJECTION, SOLUTION INTRAVENOUS at 08:03

## 2023-03-18 RX ADMIN — POTASSIUM CHLORIDE 40 MEQ: 1500 TABLET, EXTENDED RELEASE ORAL at 03:03

## 2023-03-18 NOTE — CONSULTS
[facility] Surg  Neurology  Consult Note    Patient Name: Zarina Saab  MRN: [id number]  Admission Date: 3/17/2023  Hospital Length of Stay: 0 days  Code Status: Full Code   Attending Provider: Wayne James DO   Consulting Provider: Keaton Ruelas MD  Primary Care Physician: Primary Doctor No  Principal Problem:Meningitis like reaction    Inpatient consult to Neurology  Consult performed by: Keaton Ruelas MD  Consult ordered by: Wayne James DO      Subjective:     Chief Complaint:  Headache, fever     HPI: 17 y/o female with HX of migraine comes to ED for headache. Pt states that she has had a headache for at least four days. This has been accompanied by neck pain and stiffness. Ms. Saab went to [facility] for similar symptoms a few days back. In ED she was found to have a fever of 101F. Pt admitted for suspicion of meningitis. Of note pt is in college and lives in a dorm. No sick contacts.    Past Medical History:   Diagnosis Date    Migraine headache     Post concussive syndrome        No past surgical history on file.    Review of patient's allergies indicates:  No Known Allergies    Current Neurological Medications:     No current facility-administered medications on file prior to encounter.     Current Outpatient Medications on File Prior to Encounter   Medication Sig    aspirin/acetaminophen/caffeine (EXCEDRIN EXTRA STRENGTH ORAL)     LIDOcaine (LIDODERM) 5 % Place 1 patch onto the skin once daily. Remove & Discard patch within 12 hours or as directed by MD    methocarbamoL (ROBAXIN) 750 MG Tab Take 2 tablets (1,500 mg total) by mouth 3 (three) times daily. for 5 days    ondansetron (ZOFRAN-ODT) disintegrating tablet     promethazine (PHENERGAN) 25 MG tablet Take 1 tablet (25 mg total) by mouth every 6 (six) hours as needed for Nausea.    sumatriptan (IMITREX) 50 MG tablet Take 50 mg by mouth every 2 (two) hours as needed for Migraine.    [DISCONTINUED] SUMAtriptan (IMITREX) 20 mg/actuation  nasal spray       Family History    None       Tobacco Use    Smoking status: Never    Smokeless tobacco: Never   Substance and Sexual Activity    Alcohol use: Never    Drug use: Never    Sexual activity: Not on file     Review of Systems   Constitutional:  Negative for fever.   HENT:  Negative for trouble swallowing.    Eyes:  Negative for photophobia.   Respiratory:  Negative for shortness of breath.    Cardiovascular:  Negative for chest pain.   Gastrointestinal:  Negative for abdominal pain.   Genitourinary:  Negative for dysuria.   Musculoskeletal:  Negative for back pain.   Neurological:  Positive for headaches.   Psychiatric/Behavioral:  Negative for confusion.    Objective:     Vital Signs (Most Recent):  Temp: 97.6 °F (36.4 °C) (03/18/23 1211)  Pulse: (!) 57 (03/18/23 1211)  Resp: 18 (03/18/23 1211)  BP: (!) 98/55 (03/18/23 1211)  SpO2: 99 % (03/18/23 1211)   Vital Signs (24h Range):  Temp:  [97.6 °F (36.4 °C)-101.1 °F (38.4 °C)] 97.6 °F (36.4 °C)  Pulse:  [] 57  Resp:  [16-19] 18  SpO2:  [95 %-100 %] 99 %  BP: ()/(55-61) 98/55     Weight: 57.2 kg (126 lb)  Body mass index is 20.97 kg/m².    Physical Exam  Constitutional:       General: She is not in acute distress.  HENT:      Head: Normocephalic.   Eyes:      General:         Right eye: No discharge.         Left eye: No discharge.   Cardiovascular:      Rate and Rhythm: Normal rate.   Pulmonary:      Breath sounds: Normal breath sounds.   Abdominal:      Palpations: Abdomen is soft.   Musculoskeletal:         General: No swelling.      Cervical back: Neck supple.   Skin:     Findings: No rash.   Neurological:      Mental Status: She is oriented to person, place, and time.      Motor: Motor strength is normal.   Psychiatric:         Speech: Speech normal.       NEUROLOGICAL EXAMINATION:     MENTAL STATUS   Oriented to person, place, and time.   Speech: speech is normal   Level of consciousness: alert    CRANIAL NERVES     CN III, IV, VI    Right pupil: Size: 3 mm. Shape: regular.   Left pupil: Size: 3 mm. Shape: regular.   Nystagmus: none   Conjugate gaze: present    CN V   Right facial sensation deficit: none  Left facial sensation deficit: none    CN VII   Right facial weakness: none  Left facial weakness: none    CN XII   Tongue deviation: none    MOTOR EXAM     Strength   Strength 5/5 throughout.     SENSORY EXAM   Right arm light touch: normal  Left arm light touch: normal  Right leg light touch: normal  Left leg light touch: normal    Significant Labs: CBC:   Recent Labs   Lab 03/17/23 2203 03/18/23 0324 03/19/23  0557   WBC 4.50 4.35 4.19   HGB 13.3 10.5* 10.4*   HCT 38.6 31.8* 31.5*   PLT 97* 86* 88*     CMP:   Recent Labs   Lab 03/17/23 2203 03/18/23 0324   GLU 95 128*    139   K 3.4* 3.8    106   CO2 24 22*   BUN 11 9   CREATININE 0.8 0.7   CALCIUM 9.0 7.8*   MG  --  2.0   PROT 7.5 6.0   ALBUMIN 3.8 3.0*   BILITOT 0.3 0.2   ALKPHOS 47* 37*   AST 21 16   ALT 12 10   ANIONGAP 11 11     Urine Studies:   Recent Labs   Lab 03/17/23 2112   COLORU Yellow   APPEARANCEUA Clear   PHUR 6.0   SPECGRAV >1.030*   PROTEINUA 1+*   GLUCUA Negative   KETONESU 1+*   BILIRUBINUA Negative   OCCULTUA Trace*   NITRITE Negative   UROBILINOGEN 2.0-3.0*   LEUKOCYTESUR Trace*   RBCUA 3   WBCUA 12*   BACTERIA None   HYALINECASTS 0       Significant Imaging: I have reviewed all pertinent imaging results/findings within the past 24 hours.    Head CT  FINDINGS:  The ventricles, basal cisterns, and cortical sulci are within normal limits for patient's stated age. There is no acute intracranial hemorrhage, territorial infarct or mass effect, or midline shift. The visualized paranasal sinuses and mastoid air cells are clear. There is adenoidal hypertrophy.     Impression:     No acute intracranial abnormality detected.        Electronically signed by: Alison Mendenhall  Date:                                            03/17/2023  Time:                         "                   23:01    Assessment and Plan:     17 y/o female consulted for possible meningitis    Meningitis?: symptoms improved today but still a concern given fever, headache, neck stiffness. This could be of viral etiology since pt symptoms are relatively mild.  LP.  Will continue ceftriaxone and vancomycin.  MRI brain.  Of note, her father wants her to be D/C home if MRI is normal and has "no inflammation" since "this is likely not bacterial". I told them that she soul be monitored and that LP is something that soul be done.    Active Diagnoses:    Diagnosis Date Noted POA    PRINCIPAL PROBLEM:  Meningitis like reaction [R29.1] 03/18/2023 Yes    Fever [R50.9] 03/18/2023 Yes    Neck stiffness [M43.6] 03/18/2023 Yes    Headache [R51.9] 03/18/2023 Yes    Hypokalemia [E87.6] 03/18/2023 Yes      Problems Resolved During this Admission:       VTE Risk Mitigation (From admission, onward)           Ordered     Place sequential compression device  Until discontinued         03/18/23 0102                    Thank you for your consult. I will follow-up with patient. Please contact us if you have any additional questions.    Keaton Ruelas MD  Neurology  [facility] - Med Surg    "

## 2023-03-18 NOTE — PLAN OF CARE
[facility] - Med Surg  Discharge Assessment    Patient independent and currently lives in school dorm. No current dme or home services. Spoke with pt's father, stated pt does not have pcp and no need to set up at this time, as she could see a physician on campus if need be. TN to continue to follow for discharge needs.     Primary Care Provider: Primary Doctor No     Discharge Assessment (most recent)       BRIEF DISCHARGE ASSESSMENT - 03/18/23 0900          Discharge Planning    Assessment Type Discharge Planning Assessment     Resource/Environmental Concerns none     Support Systems Parent     Equipment Currently Used at Home none     Current Living Arrangements home     Patient/Family Anticipates Transition to home     Patient/Family Anticipated Services at Transition none     DME Needed Upon Discharge  none     Discharge Plan A Home     Discharge Plan B Home with family

## 2023-03-18 NOTE — CARE UPDATE
18 y.o female with a PMHx of migraines admitted on 03/17/2023 for further evaluation fever, headaches, and neck pain.  Concern for possible meningitis.  Lumbar puncture attempted in the ED, was unsuccessful.  IR consulted for lumbar puncture.  CSF studies ordered.  Labs without any leukocytosis. CXR without acute process. CT head with no acute intracranial abnormality detected. Patient started on IV CTX and vancomycin.    Patient with negative Kernig and Brudzinski sign on my exam.  Appears neurologically intact.  Patient is a college student at [employer], and currently lives in a dorm.  Father at [employer] [facility] [employer] states vaccinations are up-to-date.  Awaiting IR assistance for lumbar puncture. CSF studies ordered..  Blood culture with no growth to date.  Urine culture with no growth. Will order MRI of the brain for further evaluation. Neurology consulted    I reviewed the patient's medications, past medical/surgical history and the H&P note. I agree with the physical exam and assessment and plan.

## 2023-03-18 NOTE — H&P
"[facility] Medicine  History & Physical    Patient Name: Zarina Saab  MRN: [id number]  Patient Class: OP- Observation  Admission Date: 3/17/2023  Attending Physician: Wayne James DO   Primary Care Provider: Primary Doctor No         Patient information was obtained from patient and ER records.     Subjective:     Principal Problem:Meningitis like reaction    Chief Complaint:   Chief Complaint   Patient presents with    Neck Pain     Patient with neck stiffness, photophobia, fever and congestion since Monday and worsening. Patient is student at [employer] and father is concerned for meningitis. Patient ambulatory and a&ox4.    Fever        HPI: 18 y.o female with a PMHx of migraines, that presents to the ED for evaluation of a headache beginning 4 days ago. Patient reports complaints of a headache beginning on Monday, endorsing she went to the ED at [facility] where she received fluids and medications and was subsequently discharged. Patient reports her complaints did not subside and have since progressively exacerbated, endorsing now neck pain, neck stiffness, upper back pain, generalized body aches, congestion, sore throat, sinus pain, sinus pressure, as well as stating she noticed some blood in her mucus. Patient also reports moderate upper abdominal pain, decreased urination despite fluid intake, and 2 episodes of diarrhea since onset, as well as reporting her bilateral ears "feel clogged." Patient also reports she "feels lightheaded whenever she gets out of bed," and notes she has been unable to sleep. No medications taken PTA. No alleviating or exacerbating factors noted. Denies cough, SOB, CP, dysuria, hematuria, nausea, vomiting, rash, or other associated symptoms. Denies past abdominal surgeries. Denies recent sick contact or travel outside of the . Denies EtOH, tobacco, or other recreational drug usage. Work up revealed CBC unremarkable, potassium 3.4, covid negative, u/a without " signs of infection, blood cultures pending, CT of head without acute findings, chest x-ray without acute findings.  Lumbar puncture unable to be completed in ED, will consult IR.  Patient being admitted to hospital medicine observation unit for further medical management.       Past Medical History:   Diagnosis Date    Migraine headache     Post concussive syndrome        No past surgical history on file.    Review of patient's allergies indicates:  No Known Allergies    No current facility-administered medications on file prior to encounter.     Current Outpatient Medications on File Prior to Encounter   Medication Sig    aspirin/acetaminophen/caffeine (EXCEDRIN EXTRA STRENGTH ORAL)     LIDOcaine (LIDODERM) 5 % Place 1 patch onto the skin once daily. Remove & Discard patch within 12 hours or as directed by MD    methocarbamoL (ROBAXIN) 750 MG Tab Take 2 tablets (1,500 mg total) by mouth 3 (three) times daily. for 5 days    ondansetron (ZOFRAN-ODT) disintegrating tablet     promethazine (PHENERGAN) 25 MG tablet Take 1 tablet (25 mg total) by mouth every 6 (six) hours as needed for Nausea.    sumatriptan (IMITREX) 50 MG tablet Take 50 mg by mouth every 2 (two) hours as needed for Migraine.    [DISCONTINUED] SUMAtriptan (IMITREX) 20 mg/actuation nasal spray      Family History    None       Tobacco Use    Smoking status: Never    Smokeless tobacco: Never   Substance and Sexual Activity    Alcohol use: Never    Drug use: Never    Sexual activity: Not on file     Review of Systems   Constitutional:  Positive for fever. Negative for chills.   HENT:  Positive for ear pain and sinus pressure.    Eyes:  Negative for visual disturbance.   Respiratory:  Negative for chest tightness and shortness of breath.    Cardiovascular:  Negative for chest pain and palpitations.   Gastrointestinal:  Positive for abdominal pain and diarrhea.   Genitourinary:  Positive for decreased urine volume.   Musculoskeletal:  Positive  for arthralgias, myalgias, neck pain and neck stiffness.   Skin:  Negative for color change and wound.   Neurological:  Positive for weakness, light-headedness and headaches.   Hematological:  Does not bruise/bleed easily.   Psychiatric/Behavioral:  Negative for agitation.    Objective:     Vital Signs (Most Recent):  Temp: (!) 101.1 °F (38.4 °C) (03/17/23 2310)  Pulse: 82 (03/18/23 0050)  Resp: 16 (03/18/23 0050)  BP: 93/61 (03/18/23 0050)  SpO2: 96 % (03/18/23 0050)   Vital Signs (24h Range):  Temp:  [101 °F (38.3 °C)-101.1 °F (38.4 °C)] 101.1 °F (38.4 °C)  Pulse:  [] 82  Resp:  [16-19] 16  SpO2:  [95 %-100 %] 96 %  BP: ()/(55-61) 93/61     Weight: 57.2 kg (126 lb)  Body mass index is 20.97 kg/m².    Physical Exam  HENT:      Head: Normocephalic and atraumatic.      Nose: No congestion or rhinorrhea.      Mouth/Throat:      Mouth: Mucous membranes are moist.   Cardiovascular:      Rate and Rhythm: Normal rate.      Pulses: Normal pulses.   Pulmonary:      Breath sounds: Normal breath sounds. No wheezing.   Abdominal:      General: Bowel sounds are normal.      Palpations: Abdomen is soft.      Tenderness: There is abdominal tenderness.   Musculoskeletal:         General: No deformity.      Cervical back: Normal range of motion and neck supple.      Right lower leg: No edema.      Left lower leg: No edema.   Skin:     General: Skin is warm and dry.   Neurological:      Mental Status: She is alert and oriented to person, place, and time.   Psychiatric:         Mood and Affect: Mood normal.           Significant Labs: All pertinent labs within the past 24 hours have been reviewed.    Significant Imaging: I have reviewed all pertinent imaging results/findings within the past 24 hours.    Assessment/Plan:     * Meningitis like reaction  Fever  Neck Stiffness  headaches    Reports headaches, neck pain  Consult IR for lumbar puncture  Continue vanc and ceftriaxone  Droplet precautions  IVFs  Blood cultures  pending  Prn pain control  Prn antipyretics             Hypokalemia  Replete  Monitor         VTE Risk Mitigation (From admission, onward)         Ordered     Place sequential compression device  Until discontinued         03/18/23 0102                     On 03/18/2023, patient should be placed in hospital observation services under my care in collaboration with Dr. Bobo.      Steffi Wilson NP  Department of Hospital Medicine  [facility] Surg

## 2023-03-18 NOTE — SUBJECTIVE & OBJECTIVE
Past Medical History:   Diagnosis Date    Migraine headache     Post concussive syndrome        No past surgical history on file.    Review of patient's allergies indicates:  No Known Allergies    No current facility-administered medications on file prior to encounter.     Current Outpatient Medications on File Prior to Encounter   Medication Sig    aspirin/acetaminophen/caffeine (EXCEDRIN EXTRA STRENGTH ORAL)     LIDOcaine (LIDODERM) 5 % Place 1 patch onto the skin once daily. Remove & Discard patch within 12 hours or as directed by MD    methocarbamoL (ROBAXIN) 750 MG Tab Take 2 tablets (1,500 mg total) by mouth 3 (three) times daily. for 5 days    ondansetron (ZOFRAN-ODT) disintegrating tablet     promethazine (PHENERGAN) 25 MG tablet Take 1 tablet (25 mg total) by mouth every 6 (six) hours as needed for Nausea.    sumatriptan (IMITREX) 50 MG tablet Take 50 mg by mouth every 2 (two) hours as needed for Migraine.    [DISCONTINUED] SUMAtriptan (IMITREX) 20 mg/actuation nasal spray      Family History    None       Tobacco Use    Smoking status: Never    Smokeless tobacco: Never   Substance and Sexual Activity    Alcohol use: Never    Drug use: Never    Sexual activity: Not on file     Review of Systems   Constitutional:  Positive for fever. Negative for chills.   HENT:  Positive for ear pain and sinus pressure.    Eyes:  Negative for visual disturbance.   Respiratory:  Negative for chest tightness and shortness of breath.    Cardiovascular:  Negative for chest pain and palpitations.   Gastrointestinal:  Positive for abdominal pain and diarrhea.   Genitourinary:  Positive for decreased urine volume.   Musculoskeletal:  Positive for arthralgias, myalgias, neck pain and neck stiffness.   Skin:  Negative for color change and wound.   Neurological:  Positive for weakness, light-headedness and headaches.   Hematological:  Does not bruise/bleed easily.   Psychiatric/Behavioral:  Negative for agitation.    Objective:      Vital Signs (Most Recent):  Temp: (!) 101.1 °F (38.4 °C) (03/17/23 2310)  Pulse: 82 (03/18/23 0050)  Resp: 16 (03/18/23 0050)  BP: 93/61 (03/18/23 0050)  SpO2: 96 % (03/18/23 0050)   Vital Signs (24h Range):  Temp:  [101 °F (38.3 °C)-101.1 °F (38.4 °C)] 101.1 °F (38.4 °C)  Pulse:  [] 82  Resp:  [16-19] 16  SpO2:  [95 %-100 %] 96 %  BP: ()/(55-61) 93/61     Weight: 57.2 kg (126 lb)  Body mass index is 20.97 kg/m².    Physical Exam  HENT:      Head: Normocephalic and atraumatic.      Nose: No congestion or rhinorrhea.      Mouth/Throat:      Mouth: Mucous membranes are moist.   Cardiovascular:      Rate and Rhythm: Normal rate.      Pulses: Normal pulses.   Pulmonary:      Breath sounds: Normal breath sounds. No wheezing.   Abdominal:      General: Bowel sounds are normal.      Palpations: Abdomen is soft.      Tenderness: There is abdominal tenderness.   Musculoskeletal:         General: No deformity.      Cervical back: Normal range of motion and neck supple.      Right lower leg: No edema.      Left lower leg: No edema.   Skin:     General: Skin is warm and dry.   Neurological:      Mental Status: She is alert and oriented to person, place, and time.   Psychiatric:         Mood and Affect: Mood normal.           Significant Labs: All pertinent labs within the past 24 hours have been reviewed.    Significant Imaging: I have reviewed all pertinent imaging results/findings within the past 24 hours.

## 2023-03-18 NOTE — ED PROVIDER NOTES
"Encounter Date: 3/17/2023    SCRIBE #1 NOTE: I, Rob Juarez, am scribing for, and in the presence of,  Lee Dutta MD. I have scribed the following portions of the note - Other sections scribed: HPI, ROS, PE.     History     Chief Complaint   Patient presents with    Neck Pain     Patient with neck stiffness, photophobia, fever and congestion since Monday and worsening. Patient is student at [employer] and father is concerned for meningitis. Patient ambulatory and a&ox4.    Fever     Zarina Saab is a 18 y.o female with a PMHx of migraines, that presents to the ED for evaluation of a headache beginning 4 days ago. Patient reports complaints of a headache beginning on Monday, endorsing she went to the ED at [facility] where she received fluids and medications and was subsequently discharged. Patient reports her complaints did not subside and have since progressively exacerbated, endorsing now neck pain, neck stiffness, upper back pain, generalized body aches, congestion, sore throat, sinus pain, sinus pressure, as well as stating she noticed some "blood in her mucus." Patient also reports moderate upper abdominal pain, decreased urination despite fluid intake, and 2 episodes of diarrhea since onset, as well as reporting her bilateral ears "feel clogged." Patient also reports she "feels lightheaded whenever she gets out of bed," and notes she has been unable to sleep. No medications taken PTA. No alleviating or exacerbating factors noted. Denies cough, SOB, CP, dysuria, hematuria, nausea, vomiting, rash, or other associated symptoms. Denies past abdominal surgeries. Denies recent sick contact or travel outside of the [place]. Denies EtOH, tobacco, or other recreational drug usage. NKDA.    The history is provided by the patient. No  was used.   Review of patient's allergies indicates:  No Known Allergies  Past Medical History:   Diagnosis Date    Migraine headache     Post concussive syndrome      No " past surgical history on file.  No family history on file.  Social History     Tobacco Use    Smoking status: Never    Smokeless tobacco: Never   Substance Use Topics    Alcohol use: Never    Drug use: Never     Review of Systems   Constitutional:  Negative for chills and fever.   HENT:  Positive for congestion, sinus pressure, sinus pain and sore throat.         (+) pressure to bilateral ears   Eyes:  Negative for visual disturbance.   Respiratory:  Negative for cough and shortness of breath.    Cardiovascular:  Negative for chest pain.   Gastrointestinal:  Positive for abdominal pain (moderate, upper) and diarrhea (x2). Negative for blood in stool, nausea and vomiting.   Genitourinary:  Positive for decreased urine volume. Negative for dysuria and hematuria.   Musculoskeletal:  Positive for back pain (upper), myalgias, neck pain and neck stiffness.   Skin:  Negative for rash.   Neurological:  Positive for light-headedness and headaches. Negative for weakness.   Psychiatric/Behavioral:  Positive for sleep disturbance. Negative for confusion.      Physical Exam     Initial Vitals [03/17/23 2103]   BP Pulse Resp Temp SpO2   (!) 102/59 108 18 (!) 101 °F (38.3 °C) 100 %      MAP       --         Physical Exam    Nursing note and vitals reviewed.  Constitutional: She appears well-developed and well-nourished. She does not appear ill. No distress.   Conversational.    HENT:   Head: Normocephalic and atraumatic.   Right Ear: Tympanic membrane and external ear normal. Tympanic membrane is not erythematous. No middle ear effusion.   Left Ear: Tympanic membrane and external ear normal. Tympanic membrane is not erythematous.  No middle ear effusion.   Mouth/Throat: Uvula is midline. No uvula swelling. Posterior oropharyngeal erythema (mild) present. No oropharyngeal exudate or posterior oropharyngeal edema.   Eyes: Conjunctivae and EOM are normal. Pupils are equal, round, and reactive to light. Right conjunctiva is not  injected. Left conjunctiva is not injected. Right eye exhibits no nystagmus. Left eye exhibits no nystagmus.   Pupils are 2 mm bilaterally.    Neck:   Normal range of motion.  Cardiovascular:  Regular rhythm and normal heart sounds.   Tachycardia present.         No murmur heard.  Pulmonary/Chest: Breath sounds normal. No respiratory distress. She has no wheezes.   Abdominal: Abdomen is soft. There is abdominal tenderness (mild) in the right lower quadrant.   No right CVA tenderness.  No left CVA tenderness. There is no rebound, no guarding and negative Ramírez's sign.   Musculoskeletal:         General: No edema.      Cervical back: Normal range of motion.     Neurological: She is alert. GCS score is 15.   Skin: Skin is warm.   No rash on extremities noted.    Psychiatric: She has a normal mood and affect.       ED Course   Lumbar Puncture    Date/Time: 3/18/2023 12:56 AM  Location procedure was performed: [facility] EMERGENCY DEPARTMENT  Performed by: Lee Dutta MD  Authorized by: Lee Dutta MD   Consent Done: Yes  Indications: evaluation for infection    Anesthesia:  Local Anesthetic: lidocaine 1% without epinephrine    Patient sedated: no  Preparation: Patient was prepped and draped in the usual sterile fashion.  Lumbar space: L3-L4 interspace  Patient's position: sitting  Needle gauge: 22  Needle type: lacho point  Needle length: 1.5 in  Number of attempts: 3  Complications: No  Specimens: No  Comments: Unsuccessful lumbar puncture at obtaining CSF      Labs Reviewed   URINALYSIS, REFLEX TO URINE CULTURE - Abnormal; Notable for the following components:       Result Value    Specific Gravity, UA >1.030 (*)     Protein, UA 1+ (*)     Ketones, UA 1+ (*)     Occult Blood UA Trace (*)     Urobilinogen, UA 2.0-3.0 (*)     Leukocytes, UA Trace (*)     All other components within normal limits    Narrative:     Specimen Source->Urine   URINALYSIS MICROSCOPIC - Abnormal; Notable for the following components:     WBC, UA 12 (*)     All other components within normal limits    Narrative:     Specimen Source->Urine   CBC W/ AUTO DIFFERENTIAL - Abnormal; Notable for the following components:    Platelets 97 (*)     All other components within normal limits   COMPREHENSIVE METABOLIC PANEL - Abnormal; Notable for the following components:    Potassium 3.4 (*)     Alkaline Phosphatase 47 (*)     All other components within normal limits   CULTURE, URINE   CULTURE, BLOOD   CULTURE, BLOOD   LACTIC ACID, PLASMA   HETEROPHILE AB SCREEN   CBC W/ AUTO DIFFERENTIAL   COMPREHENSIVE METABOLIC PANEL   MAGNESIUM   PHOSPHORUS   POCT URINE PREGNANCY   SARS-COV-2 RDRP GENE   POCT INFLUENZA A/B MOLECULAR   POCT STREP A MOLECULAR          Imaging Results              CT Head Without Contrast (Final result)  Result time 03/17/23 23:01:28      Final result by Alison Mendenhall MD (03/17/23 23:01:28)                   Impression:      No acute intracranial abnormality detected.      Electronically signed by: Alison Mendenhall  Date:    03/17/2023  Time:    23:01               Narrative:    EXAMINATION:  CT OF THE HEAD WITHOUT    CLINICAL HISTORY:  Meningitis/CNS infection suspected;    TECHNIQUE:  5 mm unenhanced axial images were obtained from the skull base to the vertex.    COMPARISON:  None.    FINDINGS:  The ventricles, basal cisterns, and cortical sulci are within normal limits for patient's stated age. There is no acute intracranial hemorrhage, territorial infarct or mass effect, or midline shift. The visualized paranasal sinuses and mastoid air cells are clear. There is adenoidal hypertrophy.                                       X-Ray Chest AP Portable (Final result)  Result time 03/17/23 22:34:18      Final result by Shelton Traylor MD (03/17/23 22:34:18)                   Impression:      No acute abnormality.      Electronically signed by: Shelton Traylor  Date:    03/17/2023  Time:    22:34               Narrative:     EXAMINATION:  XR CHEST AP PORTABLE    CLINICAL HISTORY:  Sepsis;    TECHNIQUE:  Single frontal view of the chest was performed.    COMPARISON:  None    FINDINGS:  The lungs are clear, with normal appearance of pulmonary vasculature and no pleural effusion or pneumothorax.    The cardiac silhouette is normal in size. The hilar and mediastinal contours are unremarkable.    Bones are intact.                                       Medications   LIDOcaine 5 % patch 1 patch (1 patch Transdermal Patch Applied 3/17/23 2225)   melatonin tablet 6 mg (has no administration in time range)   acetaminophen tablet 650 mg (has no administration in time range)   ondansetron injection 4 mg (has no administration in time range)   senna-docusate 8.6-50 mg per tablet 1 tablet (has no administration in time range)   aluminum-magnesium hydroxide-simethicone 200-200-20 mg/5 mL suspension 30 mL (has no administration in time range)   vancomycin - pharmacy to dose (has no administration in time range)   morphine injection 2 mg (has no administration in time range)   cefTRIAXone 2 gram/50 mL IVPB 2 g (has no administration in time range)   ketorolac injection 15 mg (has no administration in time range)   vancomycin (VANCOCIN) 1,000 mg in dextrose 5 % (D5W) 250 mL IVPB (Vial-Mate) (1,000 mg Intravenous Trough Due As Scheduled Before Dose 3/19/23 0900)   0.9%  NaCl infusion ( Intravenous New Bag 3/18/23 0306)   potassium chloride SA CR tablet 40 mEq (has no administration in time range)   sodium chloride 0.9% bolus 1,716 mL 1,716 mL (0 mLs Intravenous Stopped 3/18/23 0051)   cefTRIAXone 2 gram/50 mL IVPB 2 g (0 g Intravenous Stopped 3/18/23 0052)   vancomycin 1,250 mg in dextrose 5 % (D5W) 250 mL IVPB (Vial-Mate) (0 mg Intravenous Stopped 3/17/23 2338)   dexAMETHasone injection 8 mg (8 mg Intravenous Given 3/17/23 2219)   ketorolac injection 15 mg (15 mg Intravenous Given 3/17/23 2214)   morphine injection 4 mg (4 mg Intravenous Given 3/17/23  5390)   LIDOcaine HCL 10 mg/ml (1%) injection 5 mL (5 mLs Infiltration Given by Other 3/17/23 0581)   acetaminophen tablet 650 mg (650 mg Oral Given 3/18/23 0049)     Medical Decision Making:   History:   Old Medical Records: I decided to obtain old medical records.  Initial Assessment:     18-year-old female presenting for neck pain and headache.  Patient reported roughly 1 week if symptoms.  Patient reports diffuse myalgias.  Patient is febrile on exam.  Patient non ill-appearing.  No oropharyngeal exudate or edema.  Due to reported neck pain and headache without other source of infection discussed obtaining lumbar puncture.  Anatomically patient not difficult for lumbar puncture however no CSF obtained on puncture.  Patient tolerated procedure well.  Patient would likely benefit from lumbar puncture from fluoroscopy.  Differential Diagnosis:   Meningitis, encephalitis, viral URI  Clinical Tests:   Lab Tests: Ordered and Reviewed  Radiological Study: Ordered and Reviewed  ED Management:    Problems considered includes headache, neck pain (Signs & symptoms, differentials)  Chronic problems impacting care includes none.  Imaging independently reviewed.  Agree with radiologist's interpretation. Imaging includes no infiltrate noted on chest x-ray.  No ICH or mass noted on CT head.  All labs reviewed and considered in the patient's differential diagnosis. Discussion of labs includes no leukocytosis..    Decision regarding hospitalization.  The patient requires additional care in the hospital overnight.   EDGARD Riley with Hospital Medicine will accept the patient. Labs, imaging, or procedures were discussed.    Plan was discussed with the patient.  This includes plan for admission for IR lumbar puncture, lab and imaging results.    All questions or concerns have been addressed.         Scribe Attestation:   Scribe #1: I performed the above scribed service and the documentation accurately describes the services I performed.  I attest to the accuracy of the note.      ED Course as of 03/18/23 0319   Fri Mar 17, 2023   2230 Reports neck pain still severe at this time. []   2256 WBC: 4.50 [JM]   Sat Mar 18, 2023   0053 Case discussed with AMY Wilson.  []      ED Course User Index  [JM] Lee Dutta MD                 Clinical Impression:   Final diagnoses:  [R51.9] Headache  [R50.9] Fever, unspecified fever cause (Primary)  [M54.2] Neck pain        ED Disposition Condition    Observation Stable               I, Lee Dutta, personally performed the services described in this documentation. All medical record entries made by the scribe were at my direction and in my presence. I have reviewed the chart and agree that the record reflects my personal performance and is accurate and complete.      Lee Dutta MD  03/18/23 0319

## 2023-03-18 NOTE — PLAN OF CARE
Patient arrived from ED around 0300. Slept for a few hours after admission. BP lower this morning, patient remains asymptomatic. Patient likely to receive lumbar puncture today. Remains NPO besides small sips of water.    Problem: Adult Inpatient Plan of Care  Goal: Plan of Care Review  Outcome: Ongoing, Progressing  Goal: Optimal Comfort and Wellbeing  Outcome: Ongoing, Progressing

## 2023-03-18 NOTE — HPI
"18 y.o female with a PMHx of migraines, that presents to the ED for evaluation of a headache beginning 4 days ago. Patient reports complaints of a headache beginning on Monday, endorsing she went to the ED at [facility] where she received fluids and medications and was subsequently discharged. Patient reports her complaints did not subside and have since progressively exacerbated, endorsing now neck pain, neck stiffness, upper back pain, generalized body aches, congestion, sore throat, sinus pain, sinus pressure, as well as stating she noticed some blood in her mucus. Patient also reports moderate upper abdominal pain, decreased urination despite fluid intake, and 2 episodes of diarrhea since onset, as well as reporting her bilateral ears "feel clogged." Patient also reports she "feels lightheaded whenever she gets out of bed," and notes she has been unable to sleep. No medications taken PTA. No alleviating or exacerbating factors noted. Denies cough, SOB, CP, dysuria, hematuria, nausea, vomiting, rash, or other associated symptoms. Denies past abdominal surgeries. Denies recent sick contact or travel outside of the [place]. Denies EtOH, tobacco, or other recreational drug usage. Work up revealed CBC unremarkable, potassium 3.4, covid negative, u/a without signs of infection, blood cultures pending, CT of head without acute findings, chest x-ray without acute findings.  Lumbar puncture unable to be completed in ED, will consult IR.  Patient being admitted to hospital medicine observation unit for further medical management.   "

## 2023-03-18 NOTE — PROGRESS NOTES
Pharmacokinetic Initial Assessment: IV Vancomycin    Assessment/Plan:    Initiate intravenous vancomycin with loading dose of 1250 mg once followed by a maintenance dose of vancomycin 1000 mg IV every 12 hours  Desired empiric serum trough concentration is 15 to 20 mcg/mL  Draw vancomycin trough level 60 min prior to fourth dose on 3/19/23 at approximately 0900  Pharmacy will continue to follow and monitor vancomycin.      Please contact pharmacy at extension [phone] with any questions regarding this assessment.     Thank you for the consult,   Pillo Roberson       Patient brief summary:  Zarina Saab is a 18 y.o. female initiated on antimicrobial therapy with IV Vancomycin for treatment of suspected meningitis    Drug Allergies:   Review of patient's allergies indicates:  No Known Allergies    Actual Body Weight:   57.2 kg    Renal Function:   Estimated Creatinine Clearance: 100.9 mL/min (based on SCr of 0.8 mg/dL).,     Dialysis Method (if applicable):  N/A    CBC (last 72 hours):  Recent Labs   Lab Result Units 03/17/23  2203   WBC K/uL 4.50   Hemoglobin g/dL 13.3   Hematocrit % 38.6   Platelets K/uL 97*   Gran % % 64.9   Lymph % % 26.0   Mono % % 8.9   Eosinophil % % 0.0   Basophil % % 0.0   Differential Method  Automated       Metabolic Panel (last 72 hours):  Recent Labs   Lab Result Units 03/17/23  2112 03/17/23  2203   Sodium mmol/L  --  136   Potassium mmol/L  --  3.4*   Chloride mmol/L  --  101   CO2 mmol/L  --  24   Glucose mg/dL  --  95   Glucose, UA  Negative  --    BUN mg/dL  --  11   Creatinine mg/dL  --  0.8   Albumin g/dL  --  3.8   Total Bilirubin mg/dL  --  0.3   Alkaline Phosphatase U/L  --  47*   AST U/L  --  21   ALT U/L  --  12       Drug levels (last 3 results):  No results for input(s): VANCOMYCINRA, VANCORANDOM, VANCOMYCINPE, VANCOPEAK, VANCOMYCINTR, VANCOTROUGH in the last 72 hours.    Microbiologic Results:  Microbiology Results (last 7 days)       Procedure Component Value Units  Date/Time    Blood culture x two cultures. Draw prior to antibiotics. [[id number]] Collected: 03/17/23 2200    Order Status: Sent Specimen: Blood from Peripheral, Antecubital, Left Updated: 03/17/23 2207    Blood culture x two cultures. Draw prior to antibiotics. [[id number]] Collected: 03/17/23 2145    Order Status: Sent Specimen: Blood from Peripheral, Antecubital, Right Updated: 03/17/23 2203    Urine culture [[id number]] Collected: 03/17/23 2112    Order Status: No result Specimen: Urine Updated: 03/17/23 2129

## 2023-03-18 NOTE — ASSESSMENT & PLAN NOTE
Fever  Neck Stiffness  headaches    Reports headaches, neck pain  Consult IR for lumbar puncture  Continue vanc and ceftriaxone  Droplet precautions  IVFs  Blood cultures pending  Prn pain control  Prn antipyretics

## 2023-03-19 VITALS
WEIGHT: 126 LBS | BODY MASS INDEX: 20.99 KG/M2 | DIASTOLIC BLOOD PRESSURE: 51 MMHG | TEMPERATURE: 98 F | RESPIRATION RATE: 18 BRPM | HEIGHT: 65 IN | SYSTOLIC BLOOD PRESSURE: 96 MMHG | OXYGEN SATURATION: 98 % | HEART RATE: 60 BPM

## 2023-03-19 PROBLEM — D64.9 ANEMIA: Status: ACTIVE | Noted: 2023-03-19

## 2023-03-19 LAB
BACTERIA UR CULT: NO GROWTH
BASOPHILS # BLD AUTO: 0.01 K/UL (ref 0–0.2)
BASOPHILS NFR BLD: 0.2 % (ref 0–1.9)
DIFFERENTIAL METHOD: ABNORMAL
EOSINOPHIL # BLD AUTO: 0 K/UL (ref 0–0.5)
EOSINOPHIL NFR BLD: 0.2 % (ref 0–8)
ERYTHROCYTE [DISTWIDTH] IN BLOOD BY AUTOMATED COUNT: 13.2 % (ref 11.5–14.5)
HCT VFR BLD AUTO: 31.5 % (ref 37–48.5)
HGB BLD-MCNC: 10.4 G/DL (ref 12–16)
IMM GRANULOCYTES # BLD AUTO: 0.01 K/UL (ref 0–0.04)
IMM GRANULOCYTES NFR BLD AUTO: 0.2 % (ref 0–0.5)
IRON SERPL-MCNC: 88 UG/DL (ref 30–160)
LYMPHOCYTES # BLD AUTO: 2.3 K/UL (ref 1–4.8)
LYMPHOCYTES NFR BLD: 53.9 % (ref 18–48)
MCH RBC QN AUTO: 30 PG (ref 27–31)
MCHC RBC AUTO-ENTMCNC: 33 G/DL (ref 32–36)
MCV RBC AUTO: 91 FL (ref 82–98)
MONOCYTES # BLD AUTO: 0.3 K/UL (ref 0.3–1)
MONOCYTES NFR BLD: 7.9 % (ref 4–15)
NEUTROPHILS # BLD AUTO: 1.6 K/UL (ref 1.8–7.7)
NEUTROPHILS NFR BLD: 37.6 % (ref 38–73)
NRBC BLD-RTO: 0 /100 WBC
PLATELET # BLD AUTO: 88 K/UL (ref 150–450)
PMV BLD AUTO: 10.9 FL (ref 9.2–12.9)
RBC # BLD AUTO: 3.47 M/UL (ref 4–5.4)
SATURATED IRON: 34 % (ref 20–50)
TOTAL IRON BINDING CAPACITY: 262 UG/DL (ref 250–450)
TRANSFERRIN SERPL-MCNC: 177 MG/DL (ref 200–375)
VANCOMYCIN TROUGH SERPL-MCNC: 4.4 UG/ML (ref 10–22)
WBC # BLD AUTO: 4.19 K/UL (ref 3.9–12.7)

## 2023-03-19 PROCEDURE — 84466 ASSAY OF TRANSFERRIN: CPT | Performed by: HOSPITALIST

## 2023-03-19 PROCEDURE — G0378 HOSPITAL OBSERVATION PER HR: HCPCS

## 2023-03-19 PROCEDURE — 99222 PR INITIAL HOSPITAL CARE,LEVL II: ICD-10-PCS | Mod: ,,, | Performed by: INTERNAL MEDICINE

## 2023-03-19 PROCEDURE — 85025 COMPLETE CBC W/AUTO DIFF WBC: CPT | Performed by: STUDENT IN AN ORGANIZED HEALTH CARE EDUCATION/TRAINING PROGRAM

## 2023-03-19 PROCEDURE — 96361 HYDRATE IV INFUSION ADD-ON: CPT | Mod: 59

## 2023-03-19 PROCEDURE — 25000003 PHARM REV CODE 250: Performed by: HOSPITALIST

## 2023-03-19 PROCEDURE — 36415 COLL VENOUS BLD VENIPUNCTURE: CPT | Performed by: STUDENT IN AN ORGANIZED HEALTH CARE EDUCATION/TRAINING PROGRAM

## 2023-03-19 PROCEDURE — 36415 COLL VENOUS BLD VENIPUNCTURE: CPT | Performed by: HOSPITALIST

## 2023-03-19 PROCEDURE — 80202 ASSAY OF VANCOMYCIN: CPT | Performed by: HOSPITALIST

## 2023-03-19 PROCEDURE — 87205 SMEAR GRAM STAIN: CPT | Performed by: STUDENT IN AN ORGANIZED HEALTH CARE EDUCATION/TRAINING PROGRAM

## 2023-03-19 PROCEDURE — 25000003 PHARM REV CODE 250: Performed by: STUDENT IN AN ORGANIZED HEALTH CARE EDUCATION/TRAINING PROGRAM

## 2023-03-19 PROCEDURE — 96376 TX/PRO/DX INJ SAME DRUG ADON: CPT

## 2023-03-19 PROCEDURE — 25000003 PHARM REV CODE 250: Performed by: NURSE PRACTITIONER

## 2023-03-19 PROCEDURE — 87070 CULTURE OTHR SPECIMN AEROBIC: CPT | Performed by: STUDENT IN AN ORGANIZED HEALTH CARE EDUCATION/TRAINING PROGRAM

## 2023-03-19 PROCEDURE — 63600175 PHARM REV CODE 636 W HCPCS: Performed by: NURSE PRACTITIONER

## 2023-03-19 PROCEDURE — 96366 THER/PROPH/DIAG IV INF ADDON: CPT

## 2023-03-19 PROCEDURE — 99222 1ST HOSP IP/OBS MODERATE 55: CPT | Mod: ,,, | Performed by: INTERNAL MEDICINE

## 2023-03-19 PROCEDURE — 63600175 PHARM REV CODE 636 W HCPCS: Performed by: STUDENT IN AN ORGANIZED HEALTH CARE EDUCATION/TRAINING PROGRAM

## 2023-03-19 PROCEDURE — 63600175 PHARM REV CODE 636 W HCPCS: Performed by: HOSPITALIST

## 2023-03-19 PROCEDURE — 87899 AGENT NOS ASSAY W/OPTIC: CPT | Performed by: STUDENT IN AN ORGANIZED HEALTH CARE EDUCATION/TRAINING PROGRAM

## 2023-03-19 PROCEDURE — 87798 DETECT AGENT NOS DNA AMP: CPT | Performed by: STUDENT IN AN ORGANIZED HEALTH CARE EDUCATION/TRAINING PROGRAM

## 2023-03-19 RX ADMIN — VANCOMYCIN HYDROCHLORIDE 1000 MG: 1 INJECTION, POWDER, LYOPHILIZED, FOR SOLUTION INTRAVENOUS at 05:03

## 2023-03-19 RX ADMIN — Medication 6 MG: at 12:03

## 2023-03-19 RX ADMIN — VANCOMYCIN HYDROCHLORIDE 1000 MG: 1 INJECTION, POWDER, LYOPHILIZED, FOR SOLUTION INTRAVENOUS at 09:03

## 2023-03-19 RX ADMIN — KETOROLAC TROMETHAMINE 15 MG: 30 INJECTION, SOLUTION INTRAMUSCULAR at 12:03

## 2023-03-19 NOTE — PROGRESS NOTES
"[facility] Medicine  Progress Note    Patient Name: Zarina Saab  MRN: [id number]  Patient Class: OP- Observation   Admission Date: 3/17/2023  Length of Stay: 0 days  Attending Physician: Wayne James DO  Primary Care Provider: Primary Doctor No        Subjective:     Principal Problem:Meningitis like reaction        HPI:  18 y.o female with a PMHx of migraines, that presents to the ED for evaluation of a headache beginning 4 days ago. Patient reports complaints of a headache beginning on Monday, endorsing she went to the ED at [facility] where she received fluids and medications and was subsequently discharged. Patient reports her complaints did not subside and have since progressively exacerbated, endorsing now neck pain, neck stiffness, upper back pain, generalized body aches, congestion, sore throat, sinus pain, sinus pressure, as well as stating she noticed some blood in her mucus. Patient also reports moderate upper abdominal pain, decreased urination despite fluid intake, and 2 episodes of diarrhea since onset, as well as reporting her bilateral ears "feel clogged." Patient also reports she "feels lightheaded whenever she gets out of bed," and notes she has been unable to sleep. No medications taken PTA. No alleviating or exacerbating factors noted. Denies cough, SOB, CP, dysuria, hematuria, nausea, vomiting, rash, or other associated symptoms. Denies past abdominal surgeries. Denies recent sick contact or travel outside of the [place]. Denies EtOH, tobacco, or other recreational drug usage. Work up revealed CBC unremarkable, potassium 3.4, covid negative, u/a without signs of infection, blood cultures pending, CT of head without acute findings, chest x-ray without acute findings.  Lumbar puncture unable to be completed in ED, will consult IR.  Patient being admitted to hospital medicine observation unit for further medical management.       Overview/Hospital Course:  18 y.o female with a PMHx " of migraines admitted on 03/17/2023 for further evaluation fever, headaches, and neck pain.  Concern for possible meningitis.  Lumbar puncture attempted in the ED, was unsuccessful.  IR consulted for lumbar puncture, plan to perform on 03/20/23.  CSF studies ordered.  Labs without any leukocytosis. CXR without acute process. CT head with no acute intracranial abnormality detected. Patient started on IV CTX and vancomycin. MRI brain with normal MR imaging appearance of the brain.  No  enhancing intracranial lesion. Neurology and infectious disease consulted. Recommends lumbar puncture. Awaiting IR assistance for lumbar puncture.  Blood culture with no growth to date. HIV negative, RPR pending. Strep pneumo AG urine pending      Interval History: No acute overnight events. Patient afebrile overnight. She states she feeling a lot  better. No more fevers, headaches or any vision changes. States her neck pain and stiffness have resolved. Admits coughing up sputum with some blood tinged in it this morning. Currently on the end of her menstrual cycle.     Review of Systems   Constitutional:  Negative for chills, fatigue, fever (resolved) and unexpected weight change.   HENT:  Negative for congestion.    Eyes:  Negative for photophobia and visual disturbance.   Respiratory:  Negative for cough and shortness of breath.    Cardiovascular:  Negative for chest pain and palpitations.   Gastrointestinal:  Negative for abdominal pain, diarrhea, nausea and vomiting.   Genitourinary:  Negative for difficulty urinating, dysuria and hematuria.   Musculoskeletal:  Negative for arthralgias, joint swelling, myalgias, neck pain and neck stiffness.   Skin:  Negative for wound.   Neurological:  Negative for dizziness, weakness and headaches.   Psychiatric/Behavioral:  Negative for decreased concentration and hallucinations.      Objective:     Vital Signs (Most Recent):  Temp: 97.9 °F (36.6 °C) (03/19/23 1205)  Pulse: 70 (03/19/23  1257)  Resp: 18 (03/19/23 1205)  BP: (!) 100/59 (03/19/23 1257)  SpO2: 99 % (03/19/23 1205)   Vital Signs (24h Range):  Temp:  [97.6 °F (36.4 °C)-98.3 °F (36.8 °C)] 97.9 °F (36.6 °C)  Pulse:  [54-74] 70  Resp:  [18-20] 18  SpO2:  [98 %-99 %] 99 %  BP: ()/(54-72) 100/59     Weight: 57.2 kg (126 lb)  Body mass index is 20.97 kg/m².    Intake/Output Summary (Last 24 hours) at 3/19/2023 1428  Last data filed at 3/19/2023 0830  Gross per 24 hour   Intake 3705.76 ml   Output --   Net 3705.76 ml      Physical Exam  Vitals and nursing note reviewed. Exam conducted with a chaperone present.   Constitutional:       General: She is not in acute distress.     Appearance: She is not ill-appearing.   HENT:      Head: Atraumatic.      Mouth/Throat:      Mouth: Mucous membranes are moist.   Eyes:      General: No scleral icterus.     Extraocular Movements: Extraocular movements intact.      Pupils: Pupils are equal, round, and reactive to light.   Cardiovascular:      Rate and Rhythm: Normal rate and regular rhythm.      Heart sounds: No murmur heard.    No gallop.   Pulmonary:      Effort: Pulmonary effort is normal. No respiratory distress.      Breath sounds: Normal breath sounds. No wheezing or rales.   Abdominal:      General: There is no distension.      Palpations: Abdomen is soft.      Tenderness: There is no abdominal tenderness. There is no guarding.   Musculoskeletal:         General: No swelling or tenderness.      Right lower leg: No edema.      Left lower leg: No edema.   Skin:     General: Skin is warm and dry.      Findings: No erythema or rash.      Comments: No  purpura or rash present on skin exam    Neurological:      General: No focal deficit present.      Mental Status: She is alert and oriented to person, place, and time.   Psychiatric:         Mood and Affect: Mood normal.         Thought Content: Thought content normal.       Significant Labs: All pertinent labs within the past 24 hours have been  reviewed.    Significant Imaging: I have reviewed all pertinent imaging results/findings within the past 24 hours.      Assessment/Plan:      * Meningitis like reaction  -19 y/o female, college student living in [facility] presented with severe headaches associated with fever, neck pain, neck stiffness, and myalgias. Concern for possible meningtitis   -CT head w/ no acute intracranial abnormality   -MRI brain ordered to further evaluate: Normal MR imaging appearance of the brain.  No  enhancing intracranial lesion.   -Lumbar puncture performed in the ED was unsuccessful  -IR consulted for LP  -CSF studies ordered  -Blood cx with NGTD  -Neurology and infectious disease consulted  -Awaiting LP  -Continue IV antibiotics, day 2 ceftriazone and day 2 of vancomycin       Fever  -Per patient, has felt febrile for almost one week prior to admission associated with headaches, neck pain and neck stiffness. Vaccination up to date per her father at bedside   -In the ED, pt febrile with a temp of 101F  -Blood cx with NGTD  -UA does not appear grossly infected  -No leukocytosis, procal negative.         Headache  -hx of migraines. Follows with neurologist outpatient  -States it feels different from her previous migraines.   -Presented with headaches associated with neck pain, neck stiffness and fever  -CT head and MRI brain as above  -Neurology consulted      Neck stiffness  -Presented with neck pain and stiffness associated with headaches and fever  -Concern for viral etiology versus bacterial meningitis.   -Neurology and ID consulted  -continue IV abx  -Neck stiffness and pain resolved per patient on 03/19      Hypokalemia  Replete  Monitor       Anemia  Hg 10.4 on 03/19  Currently on her menstrual cycle  Iron profile pending   No evidence of acute bleed   Monitor       VTE Risk Mitigation (From admission, onward)         Ordered     Place sequential compression device  Until discontinued         03/18/23 0102                 Discharge Planning   SAVAGE:      Code Status: Full Code   Is the patient medically ready for discharge?:     Reason for patient still in hospital (select all that apply): Patient trending condition, Treatment and Consult recommendations  Discharge Plan A: Home                  Wayne James DO  Department of Hospital Medicine   [facility]

## 2023-03-19 NOTE — ASSESSMENT & PLAN NOTE
Hg 10.4 on 03/19  Currently on her menstrual cycle  Iron profile pending   No evidence of acute bleed   Monitor

## 2023-03-19 NOTE — PROGRESS NOTES
Pharmacokinetic Assessment Follow Up: IV Vancomycin    Vancomycin serum concentration assessment(s):    The trough level was drawn correctly and can be used to guide therapy at this time. The measurement is below the desired definitive target range of 10 to 20 mcg/mL.    Vancomycin Regimen Plan:    Change regimen to Vancomycin 1000 mg IV every 8 hours with next serum trough concentration measured at 0900 prior to 4th dose on 3/20    Drug levels (last 3 results):  Recent Labs   Lab Result Units 03/19/23  0912   Vancomycin-Trough ug/mL 4.4*       Pharmacy will continue to follow and monitor vancomycin.    Please contact pharmacy at extension [phone] for questions regarding this assessment.    Thank you for the consult,   Ayanna Borrero       Patient brief summary:  Zarina Saab is a 18 y.o. female initiated on antimicrobial therapy with IV Vancomycin for treatment of meningitis    The patient's current regimen is vancomycin 1gm IVPB q8h    Drug Allergies:   Review of patient's allergies indicates:  No Known Allergies    Actual Body Weight:   57.2 kg    Renal Function:   Estimated Creatinine Clearance: 115.4 mL/min (based on SCr of 0.7 mg/dL).,     Dialysis Method (if applicable):  N/A    CBC (last 72 hours):  Recent Labs   Lab Result Units 03/17/23 2203 03/18/23 0324 03/19/23  0557   WBC K/uL 4.50 4.35 4.19   Hemoglobin g/dL 13.3 10.5* 10.4*   Hematocrit % 38.6 31.8* 31.5*   Platelets K/uL 97* 86* 88*   Gran % % 64.9 70.8 37.6*   Lymph % % 26.0 24.6 53.9*   Mono % % 8.9 4.4 7.9   Eosinophil % % 0.0 0.0 0.2   Basophil % % 0.0 0.0 0.2   Differential Method  Automated Automated Automated       Metabolic Panel (last 72 hours):  Recent Labs   Lab Result Units 03/17/23  2112 03/17/23 2203 03/18/23  0324 03/18/23  1658   Sodium mmol/L  --  136 139  --    Potassium mmol/L  --  3.4* 3.8  --    Chloride mmol/L  --  101 106  --    CO2 mmol/L  --  24 22*  --    Glucose mg/dL  --  95 128*  --    Glucose, UA  Negative  --   --   --     BUN mg/dL  --  11 9  --    Creatinine mg/dL  --  0.8 0.7  --    Creatinine, Urine mg/dL  --   --   --  37.3   Albumin g/dL  --  3.8 3.0*  --    Total Bilirubin mg/dL  --  0.3 0.2  --    Alkaline Phosphatase U/L  --  47* 37*  --    AST U/L  --  21 16  --    ALT U/L  --  12 10  --    Magnesium mg/dL  --   --  2.0  --    Phosphorus mg/dL  --   --  4.2  --        Vancomycin Administrations:  vancomycin given in the last 96 hours                     vancomycin (VANCOCIN) 1,000 mg in dextrose 5 % (D5W) 250 mL IVPB (Vial-Mate) (mg) 1,000 mg New Bag 03/19/23 0956     1,000 mg New Bag 03/18/23 2237     1,000 mg New Bag  1016    vancomycin 1,250 mg in dextrose 5 % (D5W) 250 mL IVPB (Vial-Mate) (mg) 1,250 mg New Bag 03/17/23 2208                    Microbiologic Results:  Microbiology Results (last 7 days)       Procedure Component Value Units Date/Time    Culture, Respiratory with Gram Stain [[id number]] Collected: 03/19/23 0912    Order Status: Completed Specimen: Respiratory from Sputum Updated: 03/19/23 1121     Gram Stain (Respiratory) <10 epithelial cells per low power field.     Gram Stain (Respiratory) Few WBC's     Gram Stain (Respiratory) Moderate Gram negative rods     Gram Stain (Respiratory) Few Gram positive cocci in pairs    Respiratory Infection Panel (PCR), Nasopharyngeal [[id number]]     Order Status: No result Specimen: Nasopharyngeal Swab     Urine culture [[id number]] Collected: 03/17/23 2112    Order Status: Completed Specimen: Urine Updated: 03/19/23 0711     Urine Culture, Routine No growth    Narrative:      Specimen Source->Urine    Blood culture x two cultures. Draw prior to antibiotics. [[id number]] Collected: 03/17/23 2145    Order Status: Completed Specimen: Blood from Peripheral, Antecubital, Right Updated: 03/19/23 0312     Blood Culture, Routine No Growth to date    Narrative:      Aerobic and anaerobic    Blood culture x two cultures. Draw prior to antibiotics. [[id number]] Collected: 03/17/23  2200    Order Status: Completed Specimen: Blood from Peripheral, Antecubital, Left Updated: 03/18/23 2303     Blood Culture, Routine No Growth to date      No Growth to date    Narrative:      Aerobic and anaerobic    CSF culture [[id number]]     Order Status: No result Specimen: CSF (Spinal Fluid) from CSF Tap, Tube 1     Gram stain [[id number]]     Order Status: No result Specimen: CSF (Spinal Fluid) from CSF Tap, Tube 1     AFB Culture & Smear [[id number]]     Order Status: No result Specimen: CSF (Spinal Fluid) from CSF Tap, Tube 1     CSF culture with Gram Stain [[id number]]     Order Status: No result Specimen: CSF (Spinal Fluid) from CSF Tap, Tube 1     Culture, Anaerobic [[id number]]     Order Status: No result Specimen: CSF (Spinal Fluid)

## 2023-03-19 NOTE — ASSESSMENT & PLAN NOTE
-hx of migraines. Follows with neurologist outpatient  -States it feels different from her previous migraines.   -Presented with headaches associated with neck pain, neck stiffness and fever  -CT head and MRI brain as above  -Neurology consulted

## 2023-03-19 NOTE — ASSESSMENT & PLAN NOTE
-Presented with neck pain and stiffness associated with headaches and fever  -Concern for viral etiology versus bacterial meningitis.   -Neurology and ID consulted  -continue IV abx  -Neck stiffness and pain resolved per patient on 03/19

## 2023-03-19 NOTE — PLAN OF CARE
Pt free from falls, injury or any further trauma throughout shift. Pt AAO x4, ambulates independently in room. NS infusing at 100mL/hr. Provided heating pad for pain. Continued medications as ordered. Pain controlled with PRN medication.  Bed at lowest position, call light in reach, instruct pt to call for assistance. Droplet isolation precaution maintained throughout.      Problem: Adult Inpatient Plan of Care  Goal: Plan of Care Review  Outcome: Ongoing, Progressing     Problem: Pain Acute  Goal: Acceptable Pain Control and Functional Ability  Outcome: Ongoing, Progressing

## 2023-03-19 NOTE — ASSESSMENT & PLAN NOTE
-Per patient, has felt febrile for almost one week prior to admission associated with headaches, neck pain and neck stiffness. Vaccination up to date per her father at bedside   -In the ED, pt febrile with a temp of 101F  -Blood cx with NGTD  -UA does not appear grossly infected  -No leukocytosis, procal negative.

## 2023-03-19 NOTE — ASSESSMENT & PLAN NOTE
-19 y/o female, college student living in [facility] presented with severe headaches associated with fever, neck pain, neck stiffness, and myalgias. Concern for possible meningtitis   -CT head w/ no acute intracranial abnormality   -MRI brain ordered to further evaluate: Normal MR imaging appearance of the brain.  No  enhancing intracranial lesion.   -Lumbar puncture performed in the ED was unsuccessful  -IR consulted for LP  -CSF studies ordered  -Blood cx with NGTD  -Neurology and infectious disease consulted  -Awaiting LP  -Continue IV antibiotics, day 2 ceftriazone and day 2 of vancomycin

## 2023-03-19 NOTE — HOSPITAL COURSE
18 y.o female with a PMHx of migraines admitted on 03/17/2023 for further evaluation fever, headaches, and neck pain.  Concern for possible meningitis.  Lumbar puncture attempted in the ED, was unsuccessful.  IR consulted for lumbar puncture, plan to perform on 03/20/23.  CSF studies ordered.  Labs without any leukocytosis. CXR without acute process. CT head with no acute intracranial abnormality detected. Patient started on IV CTX and vancomycin. MRI brain with normal MR imaging appearance of the brain.  No  enhancing intracranial lesion. Neurology and infectious disease consulted. Recommends lumbar puncture. Awaiting IR assistance for lumbar puncture.  Blood culture with no growth to date. HIV negative, RPR pending. Strep pneumo AG urine pending      On 03/19/2023, at near 5pm, I was notified that patient wants to leave against medical advice.  There have been multiple discussions with the patient and her father at bedside by the neurologist and also by myself in regards to risks of leaving against medical advice. In spite of multiple attempts by myself and staff to convince the patient to stay for evaluation and treatment, we have unfortunately been unsuccessful. However, the patient has the capacity to give, receive, and withhold information. The patient verbalizes understanding of their condition and symptoms and that this represents a significant threat. The patient has verbalized to me that they understand the plan of diagnosis and treatment, and unfortunately will not agree and understand that it may cause worsening of their condition or even death. The patient understands that they can come back at any time for further care without prejudice and we will be happy to provide treatment again.    Patient has signed AMA forms.

## 2023-03-19 NOTE — NURSING
Patient is leaving AMA. Patient was educated about the risks  of leaving AMA. However, she decided to leave. MD and charge nurse was notified.

## 2023-03-19 NOTE — NURSING
Pt ambulating in room. Reports of upset belly, PRN medication given. NS infusing at 100mL/hr. Will continue to monitor.

## 2023-03-19 NOTE — SUBJECTIVE & OBJECTIVE
Interval History: No acute overnight events. Patient afebrile overnight. She states she feeling a lot  better. No more fevers, headaches or any vision changes. States her neck pain and stiffness have resolved. Admits coughing up sputum with some blood tinged in it this morning. Currently on the end of her menstrual cycle.     Review of Systems   Constitutional:  Negative for chills, fatigue, fever (resolved) and unexpected weight change.   HENT:  Negative for congestion.    Eyes:  Negative for photophobia and visual disturbance.   Respiratory:  Negative for cough and shortness of breath.    Cardiovascular:  Negative for chest pain and palpitations.   Gastrointestinal:  Negative for abdominal pain, diarrhea, nausea and vomiting.   Genitourinary:  Negative for difficulty urinating, dysuria and hematuria.   Musculoskeletal:  Negative for arthralgias, joint swelling, myalgias, neck pain and neck stiffness.   Skin:  Negative for wound.   Neurological:  Negative for dizziness, weakness and headaches.   Psychiatric/Behavioral:  Negative for decreased concentration and hallucinations.      Objective:     Vital Signs (Most Recent):  Temp: 97.9 °F (36.6 °C) (03/19/23 1205)  Pulse: 70 (03/19/23 1257)  Resp: 18 (03/19/23 1205)  BP: (!) 100/59 (03/19/23 1257)  SpO2: 99 % (03/19/23 1205)   Vital Signs (24h Range):  Temp:  [97.6 °F (36.4 °C)-98.3 °F (36.8 °C)] 97.9 °F (36.6 °C)  Pulse:  [54-74] 70  Resp:  [18-20] 18  SpO2:  [98 %-99 %] 99 %  BP: ()/(54-72) 100/59     Weight: 57.2 kg (126 lb)  Body mass index is 20.97 kg/m².    Intake/Output Summary (Last 24 hours) at 3/19/2023 1428  Last data filed at 3/19/2023 0830  Gross per 24 hour   Intake 3705.76 ml   Output --   Net 3705.76 ml      Physical Exam  Vitals and nursing note reviewed. Exam conducted with a chaperone present.   Constitutional:       General: She is not in acute distress.     Appearance: She is not ill-appearing.   HENT:      Head: Atraumatic.       Mouth/Throat:      Mouth: Mucous membranes are moist.   Eyes:      General: No scleral icterus.     Extraocular Movements: Extraocular movements intact.      Pupils: Pupils are equal, round, and reactive to light.   Cardiovascular:      Rate and Rhythm: Normal rate and regular rhythm.      Heart sounds: No murmur heard.    No gallop.   Pulmonary:      Effort: Pulmonary effort is normal. No respiratory distress.      Breath sounds: Normal breath sounds. No wheezing or rales.   Abdominal:      General: There is no distension.      Palpations: Abdomen is soft.      Tenderness: There is no abdominal tenderness. There is no guarding.   Musculoskeletal:         General: No swelling or tenderness.      Right lower leg: No edema.      Left lower leg: No edema.   Skin:     General: Skin is warm and dry.      Findings: No erythema or rash.      Comments: No  purpura or rash present on skin exam    Neurological:      General: No focal deficit present.      Mental Status: She is alert and oriented to person, place, and time.   Psychiatric:         Mood and Affect: Mood normal.         Thought Content: Thought content normal.       Significant Labs: All pertinent labs within the past 24 hours have been reviewed.    Significant Imaging: I have reviewed all pertinent imaging results/findings within the past 24 hours.

## 2023-03-19 NOTE — NURSING
Patient and father agreed that she will left Against Medical Advice Provider notified. However on several attempts doctor  communicated the risk of leaving facility without lumber puncture with myself present. She signed medical release AMA sheet. States understands the risk. Ambulated out facility with father. Steady gait. Denies pain. Release of information completed per patient

## 2023-03-19 NOTE — HPI
18F with h/o migranes admitted 3/17 with several days of worsening headache and neck stiffness. Reports ED visit to [facility] on Monday for similar symptoms. reports neck pain, neck stiffness, upper back pain, generalized body aches, congestion, sore throat, sinus pain, sinus pressure, feeling lightheaded when getting out of bed. Denies sick contacts, but does live in dorm. Reports h/o meningitis vaccine. Denies new or recent sexual contacts. Denies rash/skin lesions. Denies recent travel. Denies drug use. Feeling better than on arrival and wants to go home. Denies this has happened to her before. Reports Fhx of MS and lupus         Component Ref Range & Units 1 d ago   HIV 1/2 Ag/Ab Non-reactive Non-reactive                Component Ref Range & Units 1 d ago   Procalcitonin <0.25 ng/mL 0.06         1 d ago     CRP 0.0 - 8.2 mg/L 27.8 High              Component Ref Range & Units 1 d ago   Sed Rate 0 - 20 mm/Hr 35 High          Flu and covid negative    Febrile 101 on arrival     Day #3 vanc/ceftriaxone    ID consulted for possible meningitis, need for lp?

## 2023-03-19 NOTE — PLAN OF CARE
Problem: Adult Inpatient Plan of Care  Goal: Plan of Care Review  Outcome: Ongoing, Progressing  Flowsheets (Taken 3/19/2023 1423)  Plan of Care Reviewed With:   patient   father  Goal: Patient-Specific Goal (Individualized)  Outcome: Ongoing, Progressing     Problem: Infection  Goal: Absence of Infection Signs and Symptoms  Outcome: Ongoing, Progressing  Intervention: Prevent or Manage Infection  Flowsheets (Taken 3/19/2023 1423)  Fever Reduction/Comfort Measures: lightweight clothing  Infection Management: aseptic technique maintained  Isolation Precautions:   precautions maintained   droplet     Problem: Adult Inpatient Plan of Care  Goal: Plan of Care Review  Outcome: Ongoing, Progressing  Flowsheets (Taken 3/19/2023 1423)  Plan of Care Reviewed With:   patient   father     Problem: Adult Inpatient Plan of Care  Goal: Plan of Care Review  Outcome: Ongoing, Progressing  Flowsheets (Taken 3/19/2023 1423)  Plan of Care Reviewed With:   patient   father     Problem: Adult Inpatient Plan of Care  Goal: Patient-Specific Goal (Individualized)  Outcome: Ongoing, Progressing     Problem: Adult Inpatient Plan of Care  Goal: Patient-Specific Goal (Individualized)  Outcome: Ongoing, Progressing     Problem: Infection  Goal: Absence of Infection Signs and Symptoms  Outcome: Ongoing, Progressing  Intervention: Prevent or Manage Infection  Flowsheets (Taken 3/19/2023 1423)  Fever Reduction/Comfort Measures: lightweight clothing  Infection Management: aseptic technique maintained  Isolation Precautions:   precautions maintained   droplet     Problem: Infection  Goal: Absence of Infection Signs and Symptoms  Outcome: Ongoing, Progressing     Problem: Infection  Goal: Absence of Infection Signs and Symptoms  Intervention: Prevent or Manage Infection  Flowsheets (Taken 3/19/2023 1423)  Fever Reduction/Comfort Measures: lightweight clothing  Infection Management: aseptic technique maintained  Isolation Precautions:   precautions  maintained   droplet     Problem: Infection  Goal: Absence of Infection Signs and Symptoms  Intervention: Prevent or Manage Infection  Flowsheets (Taken 3/19/2023 1423)  Fever Reduction/Comfort Measures: lightweight clothing  Infection Management: aseptic technique maintained  Isolation Precautions:   precautions maintained   droplet

## 2023-03-20 ENCOUNTER — HOSPITAL ENCOUNTER (OUTPATIENT)
Dept: RADIOLOGY | Facility: HOSPITAL | Age: 18
Discharge: HOME | End: 2023-03-20
Attending: INTERNAL MEDICINE
Payer: COMMERCIAL

## 2023-03-20 ENCOUNTER — OFFICE VISIT (OUTPATIENT)
Dept: INTERNAL MEDICINE | Facility: CLINIC | Age: 18
End: 2023-03-20
Payer: COMMERCIAL

## 2023-03-20 VITALS
WEIGHT: 127.56 LBS | SYSTOLIC BLOOD PRESSURE: 94 MMHG | HEART RATE: 69 BPM | BODY MASS INDEX: 21.22 KG/M2 | DIASTOLIC BLOOD PRESSURE: 59 MMHG

## 2023-03-20 DIAGNOSIS — M79.18 CERVICAL MYOFASCIAL PAIN SYNDROME: ICD-10-CM

## 2023-03-20 DIAGNOSIS — F07.81 POST CONCUSSION SYNDROME: ICD-10-CM

## 2023-03-20 DIAGNOSIS — D69.6 THROMBOCYTOPENIA: ICD-10-CM

## 2023-03-20 DIAGNOSIS — D64.9 ANEMIA, UNSPECIFIED TYPE: ICD-10-CM

## 2023-03-20 DIAGNOSIS — Z09 HOSPITAL DISCHARGE FOLLOW-UP: ICD-10-CM

## 2023-03-20 DIAGNOSIS — G43.111 INTRACTABLE MIGRAINE WITH AURA WITH STATUS MIGRAINOSUS: Primary | ICD-10-CM

## 2023-03-20 LAB
ADENOVIRUS: DETECTED
BORDETELLA PARAPERTUSSIS (IS1001): NOT DETECTED
BORDETELLA PERTUSSIS (PTXP): NOT DETECTED
CHLAMYDIA PNEUMONIAE: NOT DETECTED
CORONAVIRUS 229E, COMMON COLD VIRUS: NOT DETECTED
CORONAVIRUS HKU1, COMMON COLD VIRUS: NOT DETECTED
CORONAVIRUS NL63, COMMON COLD VIRUS: NOT DETECTED
CORONAVIRUS OC43, COMMON COLD VIRUS: NOT DETECTED
FLUBV RNA NPH QL NAA+NON-PROBE: NOT DETECTED
HPIV1 RNA NPH QL NAA+NON-PROBE: NOT DETECTED
HPIV2 RNA NPH QL NAA+NON-PROBE: NOT DETECTED
HPIV3 RNA NPH QL NAA+NON-PROBE: NOT DETECTED
HPIV4 RNA NPH QL NAA+NON-PROBE: NOT DETECTED
HUMAN METAPNEUMOVIRUS: NOT DETECTED
MYCOPLASMA PNEUMONIAE: NOT DETECTED
RESPIRATORY INFECTION PANEL SOURCE: ABNORMAL
RSV RNA NPH QL NAA+NON-PROBE: NOT DETECTED
RV+EV RNA NPH QL NAA+NON-PROBE: NOT DETECTED
SARS-COV-2 RNA RESP QL NAA+PROBE: NOT DETECTED

## 2023-03-20 PROCEDURE — 3008F BODY MASS INDEX DOCD: CPT | Mod: CPTII,S$GLB,, | Performed by: INTERNAL MEDICINE

## 2023-03-20 PROCEDURE — 72050 X-RAY EXAM NECK SPINE 4/5VWS: CPT | Mod: TC

## 2023-03-20 PROCEDURE — 72050 X-RAY EXAM NECK SPINE 4/5VWS: CPT | Mod: 26,,, | Performed by: RADIOLOGY

## 2023-03-20 PROCEDURE — 99204 OFFICE O/P NEW MOD 45 MIN: CPT | Mod: S$GLB,,, | Performed by: INTERNAL MEDICINE

## 2023-03-20 PROCEDURE — 99999 PR PBB SHADOW E&M-EST. PATIENT-LVL III: ICD-10-PCS | Mod: PBBFAC,,, | Performed by: INTERNAL MEDICINE

## 2023-03-20 PROCEDURE — 99999 PR PBB SHADOW E&M-EST. PATIENT-LVL III: CPT | Mod: PBBFAC,,, | Performed by: INTERNAL MEDICINE

## 2023-03-20 PROCEDURE — 3078F PR MOST RECENT DIASTOLIC BLOOD PRESSURE < 80 MM HG: ICD-10-PCS | Mod: CPTII,S$GLB,, | Performed by: INTERNAL MEDICINE

## 2023-03-20 PROCEDURE — 3078F DIAST BP <80 MM HG: CPT | Mod: CPTII,S$GLB,, | Performed by: INTERNAL MEDICINE

## 2023-03-20 PROCEDURE — 72050 XR CERVICAL SPINE COMPLETE 5 VIEW: ICD-10-PCS | Mod: 26,,, | Performed by: RADIOLOGY

## 2023-03-20 PROCEDURE — 3074F PR MOST RECENT SYSTOLIC BLOOD PRESSURE < 130 MM HG: ICD-10-PCS | Mod: CPTII,S$GLB,, | Performed by: INTERNAL MEDICINE

## 2023-03-20 PROCEDURE — 3074F SYST BP LT 130 MM HG: CPT | Mod: CPTII,S$GLB,, | Performed by: INTERNAL MEDICINE

## 2023-03-20 PROCEDURE — 99204 PR OFFICE/OUTPT VISIT, NEW, LEVL IV, 45-59 MIN: ICD-10-PCS | Mod: S$GLB,,, | Performed by: INTERNAL MEDICINE

## 2023-03-20 PROCEDURE — 3008F PR BODY MASS INDEX (BMI) DOCUMENTED: ICD-10-PCS | Mod: CPTII,S$GLB,, | Performed by: INTERNAL MEDICINE

## 2023-03-20 RX ORDER — AMOXICILLIN AND CLAVULANATE POTASSIUM 500; 125 MG/1; MG/1
TABLET, FILM COATED ORAL
COMMUNITY
End: 2023-03-29

## 2023-03-20 RX ORDER — ONDANSETRON 8 MG/1
8 TABLET, ORALLY DISINTEGRATING ORAL EVERY 12 HOURS PRN
Qty: 28 TABLET | Refills: 1 | Status: SHIPPED | OUTPATIENT
Start: 2023-03-20 | End: 2023-09-19

## 2023-03-20 RX ORDER — RIZATRIPTAN BENZOATE 10 MG/1
10 TABLET ORAL
Qty: 9 TABLET | Refills: 2 | Status: SHIPPED | OUTPATIENT
Start: 2023-03-20 | End: 2023-10-03 | Stop reason: SDUPTHER

## 2023-03-20 RX ORDER — RIMEGEPANT SULFATE 75 MG/75MG
75 TABLET, ORALLY DISINTEGRATING ORAL ONCE AS NEEDED
Qty: 9 TABLET | Refills: 2 | Status: SHIPPED | OUTPATIENT
Start: 2023-03-20 | End: 2023-03-30

## 2023-03-20 RX ORDER — AMITRIPTYLINE HYDROCHLORIDE 10 MG/1
TABLET, FILM COATED ORAL
COMMUNITY
End: 2023-03-20

## 2023-03-20 RX ORDER — ASCORBIC ACID 250 MG
250 TABLET ORAL DAILY
COMMUNITY

## 2023-03-20 RX ORDER — TIZANIDINE 2 MG/1
4 TABLET ORAL NIGHTLY
Qty: 30 TABLET | Refills: 0 | Status: SHIPPED | OUTPATIENT
Start: 2023-03-20 | End: 2023-03-23 | Stop reason: SDUPTHER

## 2023-03-20 RX ORDER — CETIRIZINE HYDROCHLORIDE 10 MG/1
10 TABLET ORAL NIGHTLY
Qty: 30 TABLET | Refills: 0 | Status: SHIPPED | OUTPATIENT
Start: 2023-03-20 | End: 2023-09-18

## 2023-03-20 RX ORDER — CELECOXIB 200 MG/1
200 CAPSULE ORAL NIGHTLY
Qty: 30 CAPSULE | Refills: 1 | Status: SHIPPED | OUTPATIENT
Start: 2023-03-20 | End: 2023-09-18

## 2023-03-20 NOTE — SUBJECTIVE & OBJECTIVE
Past Medical History:   Diagnosis Date    Migraine headache     Post concussive syndrome        No past surgical history on file.    Review of patient's allergies indicates:  No Known Allergies    No current facility-administered medications on file prior to encounter.     Current Outpatient Medications on File Prior to Encounter   Medication Sig    aspirin/acetaminophen/caffeine (EXCEDRIN EXTRA STRENGTH ORAL)     LIDOcaine (LIDODERM) 5 % Place 1 patch onto the skin once daily. Remove & Discard patch within 12 hours or as directed by MD    methocarbamoL (ROBAXIN) 750 MG Tab Take 2 tablets (1,500 mg total) by mouth 3 (three) times daily. for 5 days    ondansetron (ZOFRAN-ODT) disintegrating tablet     promethazine (PHENERGAN) 25 MG tablet Take 1 tablet (25 mg total) by mouth every 6 (six) hours as needed for Nausea.    sumatriptan (IMITREX) 50 MG tablet Take 50 mg by mouth every 2 (two) hours as needed for Migraine.     FHX: lupus, MS    Tobacco Use    Smoking status: Never    Smokeless tobacco: Never   Substance and Sexual Activity    Alcohol use: Never    Drug use: Never    Sexual activity: Not on file     Review of Systems   Constitutional:  Positive for fever. Negative for chills.   HENT:  Positive for ear pain and sinus pressure.    Eyes:  Negative for visual disturbance.   Respiratory:  Negative for chest tightness and shortness of breath.    Cardiovascular:  Negative for chest pain and palpitations.   Gastrointestinal:  Positive for abdominal pain and diarrhea.   Genitourinary:  Positive for decreased urine volume.   Musculoskeletal:  Positive for arthralgias, myalgias, neck pain and neck stiffness.   Skin:  Negative for color change and wound.   Neurological:  Positive for weakness, light-headedness and headaches.   Hematological:  Does not bruise/bleed easily.   Psychiatric/Behavioral:  Negative for agitation.    Objective:     Vital Signs (Most Recent):  Temp: 98.1 °F (36.7 °C) (03/19/23 1648)  Pulse: 60  (03/19/23 1648)  Resp: 18 (03/19/23 1648)  BP: (!) 96/51 (03/19/23 1648)  SpO2: 98 % (03/19/23 1648)   Vital Signs (24h Range):  Temp:  [97.6 °F (36.4 °C)-98.1 °F (36.7 °C)] 98.1 °F (36.7 °C)  Pulse:  [58-74] 60  Resp:  [18-20] 18  SpO2:  [98 %-99 %] 98 %  BP: ()/(51-72) 96/51     Weight: 57.2 kg (126 lb)  Body mass index is 20.97 kg/m².    Physical Exam  Vitals and nursing note reviewed.   HENT:      Head: Normocephalic and atraumatic.   Pulmonary:      Effort: No respiratory distress.   Neurological:      Mental Status: She is alert and oriented to person, place, and time. Mental status is at baseline.   Psychiatric:         Mood and Affect: Mood normal.         Behavior: Behavior normal.      Exam limited 2/2 telemedicine     Significant Labs: All pertinent labs within the past 24 hours have been reviewed.    Significant Imaging: I have reviewed all pertinent imaging results/findings within the past 24 hours.
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
## 2023-03-20 NOTE — DISCHARGE SUMMARY
"[facility] Medicine  Discharge Summary      Patient Name: Zarina Saab  MRN: [id number]  [facility]: [id number]  Patient Class: OP- Observation  Admission Date: 3/17/2023  Hospital Length of Stay: 0 days  Discharge Date and Time: 3/19/2023  6:00 PM  Attending Physician: Carie att. providers found   Discharging Provider: Wayne James DO  Primary Care Provider: Primary Doctor Carie    Primary Care Team: Networked reference to record PCT     HPI:   18 y.o female with a PMHx of migraines, that presents to the ED for evaluation of a headache beginning 4 days ago. Patient reports complaints of a headache beginning on Monday, endorsing she went to the ED at [facility] where she received fluids and medications and was subsequently discharged. Patient reports her complaints did not subside and have since progressively exacerbated, endorsing now neck pain, neck stiffness, upper back pain, generalized body aches, congestion, sore throat, sinus pain, sinus pressure, as well as stating she noticed some blood in her mucus. Patient also reports moderate upper abdominal pain, decreased urination despite fluid intake, and 2 episodes of diarrhea since onset, as well as reporting her bilateral ears "feel clogged." Patient also reports she "feels lightheaded whenever she gets out of bed," and notes she has been unable to sleep. No medications taken PTA. No alleviating or exacerbating factors noted. Denies cough, SOB, CP, dysuria, hematuria, nausea, vomiting, rash, or other associated symptoms. Denies past abdominal surgeries. Denies recent sick contact or travel outside of the [place]. Denies EtOH, tobacco, or other recreational drug usage. Work up revealed CBC unremarkable, potassium 3.4, covid negative, u/a without signs of infection, blood cultures pending, CT of head without acute findings, chest x-ray without acute findings.  Lumbar puncture unable to be completed in ED, will consult IR.  Patient being admitted to hospital " medicine observation unit for further medical management.       * No surgery found *      Hospital Course:   18 y.o female with a PMHx of migraines admitted on 03/17/2023 for further evaluation fever, headaches, and neck pain.  Concern for possible meningitis.  Lumbar puncture attempted in the ED, was unsuccessful.  IR consulted for lumbar puncture, plan to perform on 03/20/23.  CSF studies ordered.  Labs without any leukocytosis. CXR without acute process. CT head with no acute intracranial abnormality detected. Patient started on IV CTX and vancomycin. MRI brain with normal MR imaging appearance of the brain.  No  enhancing intracranial lesion. Neurology and infectious disease consulted. Recommends lumbar puncture. Awaiting IR assistance for lumbar puncture.  Blood culture with no growth to date. HIV negative, RPR pending. Strep pneumo AG urine pending      On 03/19/2023, at near 5pm, I was notified that patient wants to leave against medical advice.  There have been multiple discussions with the patient and her father at bedside by the neurologist and also by myself in regards to risks of leaving against medical advice. In spite of multiple attempts by myself and staff to convince the patient to stay for evaluation and treatment, we have unfortunately been unsuccessful. However, the patient has the capacity to give, receive, and withhold information. The patient verbalizes understanding of their condition and symptoms and that this represents a significant threat. The patient has verbalized to me that they understand the plan of diagnosis and treatment, and unfortunately will not agree and understand that it may cause worsening of their condition or even death. The patient understands that they can come back at any time for further care without prejudice and we will be happy to provide treatment again.    Patient has signed AMA forms.          Goals of Care Treatment Preferences:  Code Status: Full  Code      Consults:   Consults (From admission, onward)        Status Ordering Provider     Inpatient consult to Infectious Diseases  Once        Provider:  Glendy Escudero MD    Completed MO ARIZA.     Inpatient consult to Neurology  Once        Provider:  Keaton Ruelas MD    Completed MO ARIZA.          No new Assessment & Plan notes have been filed under this hospital service since the last note was generated.  Service: Hospital Medicine    Final Active Diagnoses:    Diagnosis Date Noted POA    PRINCIPAL PROBLEM:  Meningitis like reaction [R29.1] 03/18/2023 Yes    Fever [R50.9] 03/18/2023 Yes    Headache [R51.9] 03/18/2023 Yes    Neck stiffness [M43.6] 03/18/2023 Yes    Hypokalemia [E87.6] 03/18/2023 Yes    Anemia [D64.9] 03/19/2023 Yes      Problems Resolved During this Admission:       Discharged Condition: against medical advice    Disposition: Left Against Medical Adv*    Follow Up:    Patient Instructions:   No discharge procedures on file.        Pending Diagnostic Studies:     Procedure Component Value Units Date/Time    Strep Pneumo AG Urine [[id number]] Collected: 03/19/23 1405    Order Status: Sent Lab Status: In process Updated: 03/19/23 1409    Specimen: Urine, Clean Catch              Indwelling Lines/Drains at time of discharge:   Lines/Drains/Airways     None                 Time spent on the discharge of patient: Patient left against  Medical advice. Spent greater than 35 minutes discussing and counseling patient regarding any abnormal labs, differential diagnosis, treatment options, risk-benefit, lifestyle changes, prognosis, current condition, and medications. Patient was interactive and attentive.  Discussed that leaving against medical advice may cause worsening of her condition or even death. Patient verbalized understanding and continues to want to leave against medical advice. Patient's father was at bedside for all discussions. AMA form signed. Patient's questions were  answered in a respectful and timely manner.          Wayne James DO  Department of Hospital Medicine  [facility] [place] [facility]

## 2023-03-20 NOTE — ASSESSMENT & PLAN NOTE
18F with h/o migranes admitted 3/17 with several days of worsening headache, neck stiffness, and fever. hiv negative. procalcitonin normal 0.06. esr/crp elevated. Flu and covid negative. Febrile 101 on arrival. Day #3 vanc/ceftriaxone. ID consulted for possible meningitis, need for lp?     Agree meningitis is high on d/dx and requires lp to diagnose. Viral cause more likely than bacterial. Dorm setting puts n.meningititis on d/dx, but isn't common; she says she received meningitis vaccine, but no documentation in [facility] (booster for Meningitis ACWY needed q5y). Non-infectious causes possible, but with fever would favor infectious cause. Denies recent sexual exposures and hiv negative    Recommendations:   - agree with LP. Please send gram stain, cell count, protein, glucose, aerobe culture and do a freeze and hold. If cell count elevated, add on meningoencephalitis panel pcr ([facility])  - continue vanc, high dose ceftriaxone  - strep pneumo urine antigen  - rpr  - resp infection pcr panel    Discussed assessment/plan with hospitalist

## 2023-03-21 LAB
BACTERIA BLD CULT: NORMAL
BACTERIA SPEC AEROBE CULT: NORMAL
GRAM STN SPEC: NORMAL

## 2023-03-22 LAB — BACTERIA BLD CULT: NORMAL

## 2023-03-23 ENCOUNTER — TELEPHONE (OUTPATIENT)
Dept: PHARMACY | Facility: CLINIC | Age: 18
End: 2023-03-23
Payer: COMMERCIAL

## 2023-03-23 ENCOUNTER — TELEPHONE (OUTPATIENT)
Dept: INTERNAL MEDICINE | Facility: CLINIC | Age: 18
End: 2023-03-23
Payer: COMMERCIAL

## 2023-03-23 DIAGNOSIS — M79.18 CERVICAL MYOFASCIAL PAIN SYNDROME: ICD-10-CM

## 2023-03-23 RX ORDER — TIZANIDINE 2 MG/1
2 TABLET ORAL EVERY 8 HOURS PRN
Qty: 45 TABLET | Refills: 2 | Status: SHIPPED | OUTPATIENT
Start: 2023-03-23 | End: 2023-09-19

## 2023-03-24 LAB — S PNEUM AG UR QL: NOT DETECTED
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
## 2023-03-29 ENCOUNTER — CLINICAL SUPPORT (OUTPATIENT)
Dept: INTERNAL MEDICINE | Facility: CLINIC | Age: 18
End: 2023-03-29
Payer: COMMERCIAL

## 2023-03-29 ENCOUNTER — OFFICE VISIT (OUTPATIENT)
Dept: INTERNAL MEDICINE | Facility: CLINIC | Age: 18
End: 2023-03-29
Payer: COMMERCIAL

## 2023-03-29 VITALS
DIASTOLIC BLOOD PRESSURE: 60 MMHG | OXYGEN SATURATION: 97 % | HEART RATE: 104 BPM | SYSTOLIC BLOOD PRESSURE: 96 MMHG | BODY MASS INDEX: 21.25 KG/M2 | WEIGHT: 127.56 LBS | TEMPERATURE: 98 F | HEIGHT: 65 IN

## 2023-03-29 DIAGNOSIS — M79.18 CERVICAL MYOFASCIAL PAIN SYNDROME: ICD-10-CM

## 2023-03-29 DIAGNOSIS — L70.0 ACNE VULGARIS: ICD-10-CM

## 2023-03-29 DIAGNOSIS — F07.81 POST CONCUSSION SYNDROME: Primary | ICD-10-CM

## 2023-03-29 DIAGNOSIS — M54.2 CHRONIC NECK PAIN: ICD-10-CM

## 2023-03-29 DIAGNOSIS — Z00.00 ANNUAL PHYSICAL EXAM: Primary | ICD-10-CM

## 2023-03-29 DIAGNOSIS — G89.29 CHRONIC NECK PAIN: ICD-10-CM

## 2023-03-29 DIAGNOSIS — D64.9 ANEMIA, UNSPECIFIED TYPE: ICD-10-CM

## 2023-03-29 LAB
ALBUMIN SERPL BCP-MCNC: 3.7 G/DL (ref 3.5–5.2)
ALP SERPL-CCNC: 52 U/L (ref 55–135)
ALT SERPL W/O P-5'-P-CCNC: 16 U/L (ref 10–44)
ANION GAP SERPL CALC-SCNC: 6 MMOL/L (ref 8–16)
AST SERPL-CCNC: 19 U/L (ref 10–40)
BASOPHILS # BLD AUTO: 0.02 K/UL (ref 0–0.2)
BASOPHILS NFR BLD: 0.3 % (ref 0–1.9)
BILIRUB SERPL-MCNC: 0.4 MG/DL (ref 0.1–1)
BUN SERPL-MCNC: 9 MG/DL (ref 6–20)
CALCIUM SERPL-MCNC: 9.6 MG/DL (ref 8.7–10.5)
CHLORIDE SERPL-SCNC: 104 MMOL/L (ref 95–110)
CO2 SERPL-SCNC: 32 MMOL/L (ref 23–29)
CREAT SERPL-MCNC: 0.7 MG/DL (ref 0.5–1.4)
DIFFERENTIAL METHOD: ABNORMAL
EOSINOPHIL # BLD AUTO: 0.1 K/UL (ref 0–0.5)
EOSINOPHIL NFR BLD: 1 % (ref 0–8)
ERYTHROCYTE [DISTWIDTH] IN BLOOD BY AUTOMATED COUNT: 12.8 % (ref 11.5–14.5)
EST. GFR  (NO RACE VARIABLE): >60 ML/MIN/1.73 M^2
ESTIMATED AVG GLUCOSE: 111 MG/DL (ref 68–131)
GLUCOSE SERPL-MCNC: 92 MG/DL (ref 70–110)
HBA1C MFR BLD: 5.5 % (ref 4–5.6)
HCT VFR BLD AUTO: 35.6 % (ref 37–48.5)
HGB BLD-MCNC: 11.4 G/DL (ref 12–16)
IMM GRANULOCYTES # BLD AUTO: 0.01 K/UL (ref 0–0.04)
IMM GRANULOCYTES NFR BLD AUTO: 0.2 % (ref 0–0.5)
LYMPHOCYTES # BLD AUTO: 2.4 K/UL (ref 1–4.8)
LYMPHOCYTES NFR BLD: 40 % (ref 18–48)
MCH RBC QN AUTO: 29.8 PG (ref 27–31)
MCHC RBC AUTO-ENTMCNC: 32 G/DL (ref 32–36)
MCV RBC AUTO: 93 FL (ref 82–98)
MONOCYTES # BLD AUTO: 0.5 K/UL (ref 0.3–1)
MONOCYTES NFR BLD: 8.9 % (ref 4–15)
NEUTROPHILS # BLD AUTO: 3 K/UL (ref 1.8–7.7)
NEUTROPHILS NFR BLD: 49.6 % (ref 38–73)
NRBC BLD-RTO: 0 /100 WBC
PLATELET # BLD AUTO: 227 K/UL (ref 150–450)
PMV BLD AUTO: 10 FL (ref 9.2–12.9)
POTASSIUM SERPL-SCNC: 3.9 MMOL/L (ref 3.5–5.1)
PROT SERPL-MCNC: 6.7 G/DL (ref 6–8.4)
RBC # BLD AUTO: 3.83 M/UL (ref 4–5.4)
SODIUM SERPL-SCNC: 142 MMOL/L (ref 136–145)
T4 FREE SERPL-MCNC: 0.87 NG/DL (ref 0.71–1.51)
TSH SERPL DL<=0.005 MIU/L-ACNC: 0.75 UIU/ML (ref 0.4–4)
WBC # BLD AUTO: 5.97 K/UL (ref 3.9–12.7)

## 2023-03-29 PROCEDURE — 3044F HG A1C LEVEL LT 7.0%: CPT | Mod: CPTII,S$GLB,, | Performed by: INTERNAL MEDICINE

## 2023-03-29 PROCEDURE — 1159F PR MEDICATION LIST DOCUMENTED IN MEDICAL RECORD: ICD-10-PCS | Mod: CPTII,S$GLB,, | Performed by: INTERNAL MEDICINE

## 2023-03-29 PROCEDURE — 3044F PR MOST RECENT HEMOGLOBIN A1C LEVEL <7.0%: ICD-10-PCS | Mod: CPTII,S$GLB,, | Performed by: INTERNAL MEDICINE

## 2023-03-29 PROCEDURE — 99999 PR PBB SHADOW E&M-EST. PATIENT-LVL III: ICD-10-PCS | Mod: PBBFAC,,, | Performed by: INTERNAL MEDICINE

## 2023-03-29 PROCEDURE — 99214 OFFICE O/P EST MOD 30 MIN: CPT | Mod: S$GLB,,, | Performed by: INTERNAL MEDICINE

## 2023-03-29 PROCEDURE — 99999 PR PBB SHADOW E&M-EST. PATIENT-LVL III: CPT | Mod: PBBFAC,,, | Performed by: INTERNAL MEDICINE

## 2023-03-29 PROCEDURE — 80053 COMPREHEN METABOLIC PANEL: CPT | Performed by: INTERNAL MEDICINE

## 2023-03-29 PROCEDURE — 99999 PR PBB SHADOW E&M-EST. PATIENT-LVL I: ICD-10-PCS | Mod: PBBFAC,,,

## 2023-03-29 PROCEDURE — 36415 COLL VENOUS BLD VENIPUNCTURE: CPT | Performed by: INTERNAL MEDICINE

## 2023-03-29 PROCEDURE — 3074F PR MOST RECENT SYSTOLIC BLOOD PRESSURE < 130 MM HG: ICD-10-PCS | Mod: CPTII,S$GLB,, | Performed by: INTERNAL MEDICINE

## 2023-03-29 PROCEDURE — 84443 ASSAY THYROID STIM HORMONE: CPT | Performed by: INTERNAL MEDICINE

## 2023-03-29 PROCEDURE — 85025 COMPLETE CBC W/AUTO DIFF WBC: CPT | Performed by: INTERNAL MEDICINE

## 2023-03-29 PROCEDURE — 3008F PR BODY MASS INDEX (BMI) DOCUMENTED: ICD-10-PCS | Mod: CPTII,S$GLB,, | Performed by: INTERNAL MEDICINE

## 2023-03-29 PROCEDURE — 3078F DIAST BP <80 MM HG: CPT | Mod: CPTII,S$GLB,, | Performed by: INTERNAL MEDICINE

## 2023-03-29 PROCEDURE — 3078F PR MOST RECENT DIASTOLIC BLOOD PRESSURE < 80 MM HG: ICD-10-PCS | Mod: CPTII,S$GLB,, | Performed by: INTERNAL MEDICINE

## 2023-03-29 PROCEDURE — 84439 ASSAY OF FREE THYROXINE: CPT | Performed by: INTERNAL MEDICINE

## 2023-03-29 PROCEDURE — 3008F BODY MASS INDEX DOCD: CPT | Mod: CPTII,S$GLB,, | Performed by: INTERNAL MEDICINE

## 2023-03-29 PROCEDURE — 1160F RVW MEDS BY RX/DR IN RCRD: CPT | Mod: CPTII,S$GLB,, | Performed by: INTERNAL MEDICINE

## 2023-03-29 PROCEDURE — 1160F PR REVIEW ALL MEDS BY PRESCRIBER/CLIN PHARMACIST DOCUMENTED: ICD-10-PCS | Mod: CPTII,S$GLB,, | Performed by: INTERNAL MEDICINE

## 2023-03-29 PROCEDURE — 83036 HEMOGLOBIN GLYCOSYLATED A1C: CPT | Performed by: INTERNAL MEDICINE

## 2023-03-29 PROCEDURE — 99214 PR OFFICE/OUTPT VISIT, EST, LEVL IV, 30-39 MIN: ICD-10-PCS | Mod: S$GLB,,, | Performed by: INTERNAL MEDICINE

## 2023-03-29 PROCEDURE — 3074F SYST BP LT 130 MM HG: CPT | Mod: CPTII,S$GLB,, | Performed by: INTERNAL MEDICINE

## 2023-03-29 PROCEDURE — 1159F MED LIST DOCD IN RCRD: CPT | Mod: CPTII,S$GLB,, | Performed by: INTERNAL MEDICINE

## 2023-03-29 PROCEDURE — 99999 PR PBB SHADOW E&M-EST. PATIENT-LVL I: CPT | Mod: PBBFAC,,,

## 2023-03-29 RX ORDER — METHYLPREDNISOLONE 4 MG/1
TABLET ORAL
Qty: 21 EACH | Refills: 0 | Status: SHIPPED | OUTPATIENT
Start: 2023-03-29 | End: 2023-03-29 | Stop reason: SDUPTHER

## 2023-03-29 RX ORDER — CLINDAMYCIN AND BENZOYL PEROXIDE 10; 50 MG/G; MG/G
GEL TOPICAL 2 TIMES DAILY
Qty: 50 G | Refills: 2 | Status: SHIPPED | OUTPATIENT
Start: 2023-03-29 | End: 2024-03-28

## 2023-03-29 RX ORDER — METHYLPREDNISOLONE 4 MG/1
TABLET ORAL
Qty: 21 EACH | Refills: 0 | Status: SHIPPED | OUTPATIENT
Start: 2023-03-29 | End: 2023-04-19

## 2023-03-29 RX ORDER — OXYCODONE AND ACETAMINOPHEN 7.5; 325 MG/1; MG/1
1 TABLET ORAL DAILY PRN
Qty: 21 TABLET | Refills: 0 | Status: SHIPPED | OUTPATIENT
Start: 2023-03-29 | End: 2023-09-19

## 2023-04-02 ENCOUNTER — TELEPHONE (OUTPATIENT)
Dept: INTERNAL MEDICINE | Facility: CLINIC | Age: 18
End: 2023-04-02
Payer: COMMERCIAL
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
## 2023-04-02 NOTE — TELEPHONE ENCOUNTER
Need to call [facility] pt for MRI appt and follow     Scheduled but patient not aware   PAST SURGICAL HISTORY:  No significant past surgical history

## 2023-04-03 ENCOUNTER — TELEPHONE (OUTPATIENT)
Dept: PHARMACY | Facility: CLINIC | Age: 18
End: 2023-04-03
Payer: COMMERCIAL

## 2023-04-12 NOTE — CONSULTS
[facility]  Infectious Disease  Consult Note    Patient Name: Zarina Saab  MRN: [id number]  Admission Date: 3/17/2023  Hospital Length of Stay: 0 days  Attending Physician: Wayne James DO  Primary Care Provider: Primary Doctor No     Isolation Status: Droplet    Patient information was obtained from patient and ER records.      Consults  Assessment/Plan:     Orthopedic  Neck stiffness  18F with h/o migranes admitted 3/17 with several days of worsening headache, neck stiffness, and fever. hiv negative. procalcitonin normal 0.06. esr/crp elevated. Flu and covid negative. Febrile 101 on arrival. Day #3 vanc/ceftriaxone. ID consulted for possible meningitis, need for lp?     Agree meningitis is high on d/dx and requires lp to diagnose. Viral cause more likely than bacterial. Dorm setting puts n.meningititis on d/dx, but isn't common; she says she received meningitis vaccine, but no documentation in [facility] (booster for Meningitis ACWY needed q5y). Non-infectious causes possible, but with fever would favor infectious cause. Denies recent sexual exposures and hiv negative    Recommendations:   - agree with LP. Please send gram stain, cell count, protein, glucose, aerobe culture and do a freeze and hold. If cell count elevated, add on meningoencephalitis panel pcr ([facility])  - continue vanc, high dose ceftriaxone  - strep pneumo urine antigen  - rpr  - resp infection pcr panel    Discussed assessment/plan with hospitalist        Thank you for your consult. I will follow-up with patient. Please contact us if you have any additional questions.    Glendy Escudero MD  Infectious Disease  [facility]    I have completed this tele-visit without the assistance of a telepresenter.     The attending portion of this evaluation, treatment, and documentation was performed per Glendy Escudero MD via Telemedicine AudioVisual using the secure [employer] software platform with 2 way audio/video. The provider was located  off-site and the patient is located in the hospital. The aforementioned video software was utilized to document the relevant history and physical exam    Subjective:     Principal Problem: Meningitis like reaction    HPI:   18F with h/o migranes admitted 3/17 with several days of worsening headache and neck stiffness. Reports ED visit to [facility] on Monday for similar symptoms. reports neck pain, neck stiffness, upper back pain, generalized body aches, congestion, sore throat, sinus pain, sinus pressure, feeling lightheaded when getting out of bed. Denies sick contacts, but does live in dorm. Reports h/o meningitis vaccine. Denies new or recent sexual contacts. Denies rash/skin lesions. Denies recent travel. Denies drug use. Feeling better than on arrival and wants to go home. Denies this has happened to her before. Reports Fhx of MS and lupus         Component Ref Range & Units 1 d ago   HIV 1/2 Ag/Ab Non-reactive Non-reactive                Component Ref Range & Units 1 d ago   Procalcitonin <0.25 ng/mL 0.06         1 d ago     CRP 0.0 - 8.2 mg/L 27.8 High              Component Ref Range & Units 1 d ago   Sed Rate 0 - 20 mm/Hr 35 High          Flu and covid negative    Febrile 101 on arrival     Day #3 vanc/ceftriaxone    ID consulted for possible meningitis, need for lp?       Past Medical History:   Diagnosis Date    Migraine headache     Post concussive syndrome        No past surgical history on file.    Review of patient's allergies indicates:  No Known Allergies    No current facility-administered medications on file prior to encounter.     Current Outpatient Medications on File Prior to Encounter   Medication Sig    aspirin/acetaminophen/caffeine (EXCEDRIN EXTRA STRENGTH ORAL)     LIDOcaine (LIDODERM) 5 % Place 1 patch onto the skin once daily. Remove & Discard patch within 12 hours or as directed by MD    methocarbamoL (ROBAXIN) 750 MG Tab Take 2 tablets (1,500 mg total) by mouth 3 (three) times  daily. for 5 days    ondansetron (ZOFRAN-ODT) disintegrating tablet     promethazine (PHENERGAN) 25 MG tablet Take 1 tablet (25 mg total) by mouth every 6 (six) hours as needed for Nausea.    sumatriptan (IMITREX) 50 MG tablet Take 50 mg by mouth every 2 (two) hours as needed for Migraine.     FHX: lupus, MS    Tobacco Use    Smoking status: Never    Smokeless tobacco: Never   Substance and Sexual Activity    Alcohol use: Never    Drug use: Never    Sexual activity: Not on file     Review of Systems   Constitutional:  Positive for fever. Negative for chills.   HENT:  Positive for ear pain and sinus pressure.    Eyes:  Negative for visual disturbance.   Respiratory:  Negative for chest tightness and shortness of breath.    Cardiovascular:  Negative for chest pain and palpitations.   Gastrointestinal:  Positive for abdominal pain and diarrhea.   Genitourinary:  Positive for decreased urine volume.   Musculoskeletal:  Positive for arthralgias, myalgias, neck pain and neck stiffness.   Skin:  Negative for color change and wound.   Neurological:  Positive for weakness, light-headedness and headaches.   Hematological:  Does not bruise/bleed easily.   Psychiatric/Behavioral:  Negative for agitation.    Objective:     Vital Signs (Most Recent):  Temp: 98.1 °F (36.7 °C) (03/19/23 1648)  Pulse: 60 (03/19/23 1648)  Resp: 18 (03/19/23 1648)  BP: (!) 96/51 (03/19/23 1648)  SpO2: 98 % (03/19/23 1648)   Vital Signs (24h Range):  Temp:  [97.6 °F (36.4 °C)-98.1 °F (36.7 °C)] 98.1 °F (36.7 °C)  Pulse:  [58-74] 60  Resp:  [18-20] 18  SpO2:  [98 %-99 %] 98 %  BP: ()/(51-72) 96/51     Weight: 57.2 kg (126 lb)  Body mass index is 20.97 kg/m².    Physical Exam  Vitals and nursing note reviewed.   HENT:      Head: Normocephalic and atraumatic.   Pulmonary:      Effort: No respiratory distress.   Neurological:      Mental Status: She is alert and oriented to person, place, and time. Mental status is at baseline.   Psychiatric:          Mood and Affect: Mood normal.         Behavior: Behavior normal.      Exam limited 2/2 telemedicine     Significant Labs: All pertinent labs within the past 24 hours have been reviewed.    Significant Imaging: I have reviewed all pertinent imaging results/findings within the past 24 hours.               Hydroxyzine Counseling: Patient advised that the medication is sedating and not to drive a car after taking this medication.  Patient informed of potential adverse effects including but not limited to dry mouth, urinary retention, and blurry vision.  The patient verbalized understanding of the proper use and possible adverse effects of hydroxyzine.  All of the patient's questions and concerns were addressed.

## 2023-04-16 ENCOUNTER — HOSPITAL ENCOUNTER (OUTPATIENT)
Dept: RADIOLOGY | Facility: HOSPITAL | Age: 18
Discharge: HOME | End: 2023-04-16
Attending: INTERNAL MEDICINE
Payer: COMMERCIAL

## 2023-04-16 DIAGNOSIS — G89.29 CHRONIC NECK PAIN: ICD-10-CM

## 2023-04-16 DIAGNOSIS — M79.18 CERVICAL MYOFASCIAL PAIN SYNDROME: ICD-10-CM

## 2023-04-16 DIAGNOSIS — F07.81 POST CONCUSSION SYNDROME: ICD-10-CM

## 2023-04-16 DIAGNOSIS — M54.2 CHRONIC NECK PAIN: ICD-10-CM

## 2023-04-16 PROCEDURE — 72141 MRI CERVICAL SPINE WITHOUT CONTRAST: ICD-10-PCS | Mod: 26,SP,, | Performed by: RADIOLOGY

## 2023-04-16 PROCEDURE — 72141 MRI NECK SPINE W/O DYE: CPT | Mod: TC,SP

## 2023-04-16 PROCEDURE — 72141 MRI NECK SPINE W/O DYE: CPT | Mod: 26,SP,, | Performed by: RADIOLOGY

## 2023-04-18 ENCOUNTER — OFFICE VISIT (OUTPATIENT)
Dept: INTERNAL MEDICINE | Facility: CLINIC | Age: 18
End: 2023-04-18
Payer: COMMERCIAL

## 2023-04-18 DIAGNOSIS — S13.4XXA NECK PAIN WITH NECK STIFFNESS AFTER WHIPLASH INJURY TO NECK: ICD-10-CM

## 2023-04-18 DIAGNOSIS — F07.81 POST CONCUSSION SYNDROME: Primary | ICD-10-CM

## 2023-04-18 DIAGNOSIS — G44.321 INTRACTABLE CHRONIC POST-TRAUMATIC HEADACHE: ICD-10-CM

## 2023-04-18 DIAGNOSIS — G43.719 INTRACTABLE CHRONIC MIGRAINE WITHOUT AURA AND WITHOUT STATUS MIGRAINOSUS: ICD-10-CM

## 2023-04-18 PROCEDURE — 99499 UNLISTED E&M SERVICE: CPT | Mod: 95,,, | Performed by: INTERNAL MEDICINE

## 2023-04-18 PROCEDURE — 99499 NO LOS: ICD-10-PCS | Mod: 95,,, | Performed by: INTERNAL MEDICINE

## 2023-04-19 RX ORDER — SUMATRIPTAN SUCCINATE 100 MG/1
100 TABLET ORAL DAILY PRN
Qty: 10 TABLET | Refills: 2 | Status: SHIPPED | OUTPATIENT
Start: 2023-04-19 | End: 2023-07-18

## 2023-04-19 RX ORDER — AMITRIPTYLINE HYDROCHLORIDE 10 MG/1
TABLET, FILM COATED ORAL
Qty: 60 TABLET | Refills: 2 | Status: SHIPPED | OUTPATIENT
Start: 2023-04-19

## 2023-04-19 RX ORDER — UBROGEPANT 100 MG/1
100 TABLET ORAL ONCE AS NEEDED
Qty: 9 TABLET | Refills: 2 | Status: SHIPPED | OUTPATIENT
Start: 2023-04-19 | End: 2023-04-19
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
## 2023-04-26 ENCOUNTER — TELEPHONE (OUTPATIENT)
Dept: PHARMACY | Facility: CLINIC | Age: 18
End: 2023-04-26
Payer: COMMERCIAL

## 2023-08-30 ENCOUNTER — TELEPHONE (OUTPATIENT)
Dept: INTERNAL MEDICINE | Facility: CLINIC | Age: 18
End: 2023-08-30
Payer: COMMERCIAL

## 2023-08-30 NOTE — TELEPHONE ENCOUNTER
Pt just got back to [place] and needs a follow up with me . Annual if she can and offer fitness and health  But more importantly she needs an appt with ob/gyn at [facility], [place].   Call joseph before you set up her appts.   She is a [employer] student so she should have a 1/2 day without classes.   Get her soon though before her schedule gets busy

## 2023-08-31 ENCOUNTER — TELEPHONE (OUTPATIENT)
Dept: OBSTETRICS AND GYNECOLOGY | Facility: CLINIC | Age: 18
End: 2023-08-31
Payer: COMMERCIAL

## 2023-09-07 ENCOUNTER — LAB VISIT (OUTPATIENT)
Dept: LAB | Facility: OTHER | Age: 18
End: 2023-09-07
Attending: OBSTETRICS & GYNECOLOGY
Payer: COMMERCIAL

## 2023-09-07 ENCOUNTER — OFFICE VISIT (OUTPATIENT)
Dept: OBSTETRICS AND GYNECOLOGY | Facility: CLINIC | Age: 18
End: 2023-09-07
Payer: COMMERCIAL

## 2023-09-07 VITALS
HEIGHT: 65 IN | BODY MASS INDEX: 20.94 KG/M2 | DIASTOLIC BLOOD PRESSURE: 72 MMHG | WEIGHT: 125.69 LBS | SYSTOLIC BLOOD PRESSURE: 110 MMHG

## 2023-09-07 DIAGNOSIS — Z11.3 SCREEN FOR STD (SEXUALLY TRANSMITTED DISEASE): ICD-10-CM

## 2023-09-07 DIAGNOSIS — B37.9 CANDIDIASIS: ICD-10-CM

## 2023-09-07 DIAGNOSIS — N92.1 BREAKTHROUGH BLEEDING WITH IUD: Primary | ICD-10-CM

## 2023-09-07 DIAGNOSIS — Z97.5 BREAKTHROUGH BLEEDING WITH IUD: Primary | ICD-10-CM

## 2023-09-07 LAB
B-HCG UR QL: NEGATIVE
CTP QC/QA: YES
HAV IGM SERPL QL IA: NORMAL
HBV CORE IGM SERPL QL IA: NORMAL
HBV SURFACE AG SERPL QL IA: NORMAL
HCV AB SERPL QL IA: NORMAL
HIV 1+2 AB+HIV1 P24 AG SERPL QL IA: NORMAL

## 2023-09-07 PROCEDURE — 86592 SYPHILIS TEST NON-TREP QUAL: CPT | Performed by: OBSTETRICS & GYNECOLOGY

## 2023-09-07 PROCEDURE — 87389 HIV-1 AG W/HIV-1&-2 AB AG IA: CPT | Performed by: OBSTETRICS & GYNECOLOGY

## 2023-09-07 PROCEDURE — 87591 N.GONORRHOEAE DNA AMP PROB: CPT | Performed by: OBSTETRICS & GYNECOLOGY

## 2023-09-07 PROCEDURE — 81514 NFCT DS BV&VAGINITIS DNA ALG: CPT | Performed by: OBSTETRICS & GYNECOLOGY

## 2023-09-07 PROCEDURE — 99214 OFFICE O/P EST MOD 30 MIN: CPT | Mod: S$GLB,,, | Performed by: OBSTETRICS & GYNECOLOGY

## 2023-09-07 PROCEDURE — 81025 POCT URINE PREGNANCY: ICD-10-PCS | Mod: S$GLB,,, | Performed by: OBSTETRICS & GYNECOLOGY

## 2023-09-07 PROCEDURE — 81025 URINE PREGNANCY TEST: CPT | Mod: S$GLB,,, | Performed by: OBSTETRICS & GYNECOLOGY

## 2023-09-07 PROCEDURE — 80074 ACUTE HEPATITIS PANEL: CPT | Performed by: OBSTETRICS & GYNECOLOGY

## 2023-09-07 PROCEDURE — 99214 PR OFFICE/OUTPT VISIT, EST, LEVL IV, 30-39 MIN: ICD-10-PCS | Mod: S$GLB,,, | Performed by: OBSTETRICS & GYNECOLOGY

## 2023-09-07 PROCEDURE — 36415 COLL VENOUS BLD VENIPUNCTURE: CPT | Performed by: OBSTETRICS & GYNECOLOGY

## 2023-09-07 PROCEDURE — 99999 PR PBB SHADOW E&M-EST. PATIENT-LVL III: CPT | Mod: PBBFAC,,, | Performed by: OBSTETRICS & GYNECOLOGY

## 2023-09-07 PROCEDURE — 99999 PR PBB SHADOW E&M-EST. PATIENT-LVL III: ICD-10-PCS | Mod: PBBFAC,,, | Performed by: OBSTETRICS & GYNECOLOGY

## 2023-09-07 RX ORDER — FLUCONAZOLE 150 MG/1
150 TABLET ORAL
Qty: 2 TABLET | Refills: 1 | Status: SHIPPED | OUTPATIENT
Start: 2023-09-07 | End: 2023-09-09

## 2023-09-07 RX ORDER — LEVONORGESTREL 19.5 MG/1
1 INTRAUTERINE DEVICE INTRAUTERINE ONCE
COMMUNITY

## 2023-09-07 NOTE — PROGRESS NOTES
History & Physical  Gynecology      SUBJECTIVE:     Chief Complaint: Menstrual Problem (STD screen, worse cramping with Kyleena IUD)       History of Present Illness:  Pt is a 17 y/o who presents with bleeding and cramping with the Kyleena IUD.  Had placed in 8/15/2022.  Placed because she had very irregular periods and cramping.  Was told that she can't take estrogen containing medications due to hx of migraines with possible auras vs light sensitivities.  Prior to IUD would have menses every 2-3 months or so.  Would last between 2-7 days with changes in flow.  Once IUD was placed, would have regular menses that were light and lasted 2-3 days.  However, missed menses in May and June and then had heavy long menses in July and August.  Would last for over 2 weeks.  Migraines are worse with menses and right before menses.  Also worsening of acne and PMS symptoms prior to pregnancy    Pt is sexually active.  Would like STD screening.  Has been having white thick discharge.        Review of patient's allergies indicates:  No Known Allergies    Past Medical History:   Diagnosis Date    Migraine headache     Post concussive syndrome      Past Surgical History:   Procedure Laterality Date    WISDOM TOOTH EXTRACTION       OB History    No obstetric history on file.       Family History   Problem Relation Age of Onset    Diabetes Father     Lupus Father     Multiple sclerosis Maternal Aunt     Heart disease Maternal Grandmother     Diabetes Maternal Grandmother     Multiple sclerosis Maternal Grandmother     Heart disease Paternal Grandmother     Diabetes Paternal Grandmother      Social History     Tobacco Use    Smoking status: Never    Smokeless tobacco: Never   Substance Use Topics    Alcohol use: Never    Drug use: Never       Current Outpatient Medications   Medication Sig    amitriptyline (ELAVIL) 10 MG tablet take one tablet  nightly for a week or two then increase to 2 nightly    ascorbic acid, vitamin C, (VITAMIN  C) 250 MG tablet Take 250 mg by mouth once daily.    celecoxib (CELEBREX) 200 MG capsule Take 1 capsule (200 mg total) by mouth every evening.    cetirizine (ZYRTEC) 10 MG tablet Take 1 tablet (10 mg total) by mouth every evening.    clindamycin-benzoyl peroxide (BENZACLIN) gel Apply topically 2 (two) times daily.    fluconazole (DIFLUCAN) 200 MG Tab Take 1 tablet (200 mg total) by mouth once daily. for 2 days    levonorgestreL (KYLEENA) 17.5 mcg/24 hrs (5 yrs) 19.5 mg IUD 1 each by Intrauterine route once. Placed 8/15/2022    multivitamin Chew Take 2 tablets by mouth.    ondansetron (ZOFRAN-ODT) 8 MG TbDL Dissolve 1 tablet (8 mg total) by mouth every 12 (twelve) hours as needed (nausea with).    oxyCODONE-acetaminophen (PERCOCET) 7.5-325 mg per tablet Take 1 tablet by mouth daily as needed for Pain (intractable headache).    rizatriptan (MAXALT) 10 MG tablet Take 1 tablet (10 mg total) by mouth as needed for Migraine.    sumatriptan (IMITREX) 100 MG tablet Take 1 tablet (100 mg total) by mouth daily as needed. max 2/24 hours    tiZANidine (ZANAFLEX) 2 MG tablet Take 1 tablet (2 mg total) by mouth every 8 (eight) hours as needed (neck spasm).    ubrogepant (UBRELVY) 100 mg tablet take as directed     No current facility-administered medications for this visit.         Review of Systems:  Review of Systems   Constitutional:  Negative for activity change, appetite change, chills, fatigue, fever and unexpected weight change.   Respiratory:  Negative for cough, shortness of breath and wheezing.    Cardiovascular:  Negative for chest pain and leg swelling.   Gastrointestinal:  Negative for abdominal pain, constipation, diarrhea, nausea and vomiting.   Endocrine: Negative for hair loss and hot flashes.   Genitourinary:  Positive for menstrual problem and vaginal discharge. Negative for decreased libido, dyspareunia, dysuria, frequency, pelvic pain, vaginal bleeding and vaginal pain.   Integumentary:  Positive for acne.  Negative for hair changes, nipple discharge and breast skin changes.   Neurological:  Positive for headaches.   Psychiatric/Behavioral:  Negative for sleep disturbance.    Breast: Negative for mastodynia, nipple discharge and skin changes       OBJECTIVE:     Physical Exam:  Physical Exam  Constitutional:       Appearance: She is well-developed.   HENT:      Head: Normocephalic and atraumatic.   Eyes:      General: No scleral icterus.        Right eye: No discharge.         Left eye: No discharge.      Conjunctiva/sclera: Conjunctivae normal.   Pulmonary:      Effort: Pulmonary effort is normal.      Breath sounds: No stridor.   Abdominal:      General: There is no distension.      Palpations: Abdomen is soft.      Tenderness: There is no abdominal tenderness.   Genitourinary:     Vagina: Vaginal discharge present.   Musculoskeletal:         General: Normal range of motion.   Skin:     General: Skin is warm and dry.   Neurological:      Mental Status: She is alert and oriented to person, place, and time.   Psychiatric:         Behavior: Behavior normal.         Thought Content: Thought content normal.         Judgment: Judgment normal.           ASSESSMENT:       ICD-10-CM ICD-9-CM    1. Breakthrough bleeding with IUD  N92.1 626.6 US Pelvis Comp with Transvag NON-OB (xpd    Z97.5 V45.51 POCT Urine Pregnancy      2. Screen for STD (sexually transmitted disease)  Z11.3 V74.5 C. trachomatis/N. gonorrhoeae by AMP DNA      HIV 1/2 Ag/Ab (4th Gen)      RPR      Hepatitis Panel, Acute      3. Candidiasis  B37.9 112.9 fluconazole (DIFLUCAN) 150 MG Tab      Vaginosis Screen by DNA Probe             Plan:      Zarina was seen today for menstrual problem.    Diagnoses and all orders for this visit:    Breakthrough bleeding with IUD  - Discussed bleeding with IUD.  Discussed breakthrough bleeding with IUD often being caused by thin EMS.    - Will get TVUS to assess EMS.  If EMS thin, will consider estrace.  Discussed migraines  with patient.  While she does get migraines intermittently, most are right before her menses.  Discussed that while estrace is contraindicated in patients with migraines with auras, for a lot of patients with migraines before menses, estrogen helps to improve the migraines.    - Will fix underlying bleeding.  Once fixed, will have patient log symptoms and will try course of estrace before menstrual migraines to see if improved with estrace  -     US Pelvis Comp with Transvag NON-OB (xpd; Future  -     POCT Urine Pregnancy    Screen for STD (sexually transmitted disease)  -     C. trachomatis/N. gonorrhoeae by AMP DNA  -     HIV 1/2 Ag/Ab (4th Gen); Future  -     RPR; Future  -     Hepatitis Panel, Acute; Future    Candidiasis  - Vaginal discharge on exam  -     fluconazole (DIFLUCAN) 150 MG Tab; Take 1 tablet (150 mg total) by mouth every 48 hours. for 2 days  -     Vaginosis Screen by DNA Probe        Orders Placed This Encounter   Procedures    C. trachomatis/N. gonorrhoeae by AMP DNA    Vaginosis Screen by DNA Probe    US Pelvis Comp with Transvag NON-OB (xpd    HIV 1/2 Ag/Ab (4th Gen)    RPR    Hepatitis Panel, Acute    POCT Urine Pregnancy       No follow-ups on file.     Face to Face time with patient: 30 min    35 minutes of total time spent on the encounter, which includes face to face time and non-face to face time preparing to see the patient (eg, review of tests), Obtaining and/or reviewing separately obtained history, Documenting clinical information in the electronic or other health record, Independently interpreting results (not separately reported) and communicating results to the patient/family/caregiver, or Care coordination (not separately reported).      Neelam Baltazar

## 2023-09-08 LAB
C TRACH DNA SPEC QL NAA+PROBE: NOT DETECTED
N GONORRHOEA DNA SPEC QL NAA+PROBE: NOT DETECTED
RPR SER QL: NORMAL

## 2023-09-10 LAB
BACTERIAL VAGINOSIS DNA: NEGATIVE
CANDIDA GLABRATA DNA: NEGATIVE
CANDIDA KRUSEI DNA: NEGATIVE
CANDIDA RRNA VAG QL PROBE: POSITIVE
T VAGINALIS RRNA GENITAL QL PROBE: NEGATIVE

## 2023-09-11 RX ORDER — FLUCONAZOLE 200 MG/1
200 TABLET ORAL DAILY
Qty: 2 TABLET | Refills: 0 | Status: SHIPPED | OUTPATIENT
Start: 2023-09-11 | End: 2023-09-13

## 2023-09-12 ENCOUNTER — HOSPITAL ENCOUNTER (OUTPATIENT)
Dept: RADIOLOGY | Facility: OTHER | Age: 18
Discharge: HOME | End: 2023-09-12
Attending: OBSTETRICS & GYNECOLOGY
Payer: COMMERCIAL

## 2023-09-12 DIAGNOSIS — Z97.5 BREAKTHROUGH BLEEDING WITH IUD: ICD-10-CM

## 2023-09-12 DIAGNOSIS — N92.1 BREAKTHROUGH BLEEDING WITH IUD: ICD-10-CM

## 2023-09-12 PROCEDURE — 76830 TRANSVAGINAL US NON-OB: CPT | Mod: 26,,, | Performed by: RADIOLOGY

## 2023-09-12 PROCEDURE — 76830 US PELVIS COMP WITH TRANSVAG NON-OB (XPD): ICD-10-PCS | Mod: 26,,, | Performed by: RADIOLOGY

## 2023-09-12 PROCEDURE — 76856 US EXAM PELVIC COMPLETE: CPT | Mod: 26,,, | Performed by: RADIOLOGY

## 2023-09-12 PROCEDURE — 76830 TRANSVAGINAL US NON-OB: CPT | Mod: TC

## 2023-09-12 PROCEDURE — 76856 US PELVIS COMP WITH TRANSVAG NON-OB (XPD): ICD-10-PCS | Mod: 26,,, | Performed by: RADIOLOGY

## 2023-09-18 ENCOUNTER — HOSPITAL ENCOUNTER (EMERGENCY)
Facility: HOSPITAL | Age: 18
Discharge: HOME | End: 2023-09-19
Attending: EMERGENCY MEDICINE
Payer: COMMERCIAL

## 2023-09-18 DIAGNOSIS — G43.109 MIGRAINE WITH AURA AND WITHOUT STATUS MIGRAINOSUS, NOT INTRACTABLE: Primary | ICD-10-CM

## 2023-09-18 LAB
B-HCG UR QL: NEGATIVE
CTP QC/QA: YES

## 2023-09-18 PROCEDURE — 99284 EMERGENCY DEPT VISIT MOD MDM: CPT | Mod: 25

## 2023-09-18 PROCEDURE — 96361 HYDRATE IV INFUSION ADD-ON: CPT

## 2023-09-18 PROCEDURE — 81025 URINE PREGNANCY TEST: CPT | Performed by: EMERGENCY MEDICINE

## 2023-09-18 PROCEDURE — 96375 TX/PRO/DX INJ NEW DRUG ADDON: CPT

## 2023-09-18 PROCEDURE — 96374 THER/PROPH/DIAG INJ IV PUSH: CPT

## 2023-09-18 RX ORDER — KETOROLAC TROMETHAMINE 30 MG/ML
10 INJECTION, SOLUTION INTRAMUSCULAR; INTRAVENOUS
Status: COMPLETED | OUTPATIENT
Start: 2023-09-19 | End: 2023-09-19

## 2023-09-18 RX ORDER — PROCHLORPERAZINE EDISYLATE 5 MG/ML
10 INJECTION INTRAMUSCULAR; INTRAVENOUS
Status: COMPLETED | OUTPATIENT
Start: 2023-09-19 | End: 2023-09-19

## 2023-09-18 RX ORDER — DIPHENHYDRAMINE HYDROCHLORIDE 50 MG/ML
25 INJECTION INTRAMUSCULAR; INTRAVENOUS
Status: COMPLETED | OUTPATIENT
Start: 2023-09-19 | End: 2023-09-19

## 2023-09-18 NOTE — Clinical Note
"Zarina "Christian Saab was seen and treated in our emergency department on 9/18/2023.  She may return to school on 09/20/2023.  Please excuse Zarina Saab from missed class on 9/18 as she was seen at [facility] Emergency Department for treatment of an emergency condition. She is safe to return on or before 9/20/2023. Please do not hesitate to contact if you would like further clarification or if you have any additional concerns.    Thank you  Lj Kessler MD    If you have any questions or concerns, please don't hesitate to call.      Lj Kessler MD"

## 2023-09-19 ENCOUNTER — OFFICE VISIT (OUTPATIENT)
Dept: INTERNAL MEDICINE | Facility: CLINIC | Age: 18
End: 2023-09-19
Payer: COMMERCIAL

## 2023-09-19 ENCOUNTER — CLINICAL SUPPORT (OUTPATIENT)
Dept: INTERNAL MEDICINE | Facility: CLINIC | Age: 18
End: 2023-09-19
Payer: COMMERCIAL

## 2023-09-19 VITALS
DIASTOLIC BLOOD PRESSURE: 71 MMHG | HEART RATE: 77 BPM | HEIGHT: 65 IN | RESPIRATION RATE: 16 BRPM | OXYGEN SATURATION: 96 % | SYSTOLIC BLOOD PRESSURE: 93 MMHG | SYSTOLIC BLOOD PRESSURE: 110 MMHG | BODY MASS INDEX: 21.12 KG/M2 | TEMPERATURE: 98 F | HEART RATE: 72 BPM | WEIGHT: 125.69 LBS | BODY MASS INDEX: 20.91 KG/M2 | WEIGHT: 126.75 LBS | DIASTOLIC BLOOD PRESSURE: 64 MMHG

## 2023-09-19 DIAGNOSIS — G43.119 INTRACTABLE MIGRAINE WITH AURA WITHOUT STATUS MIGRAINOSUS: ICD-10-CM

## 2023-09-19 DIAGNOSIS — M54.2 CHRONIC NECK PAIN: ICD-10-CM

## 2023-09-19 DIAGNOSIS — Z00.00 ANNUAL PHYSICAL EXAM: Primary | ICD-10-CM

## 2023-09-19 DIAGNOSIS — D69.6 THROMBOCYTOPENIA: ICD-10-CM

## 2023-09-19 DIAGNOSIS — G89.29 CHRONIC NECK PAIN: ICD-10-CM

## 2023-09-19 LAB
ALBUMIN SERPL BCP-MCNC: 3.9 G/DL (ref 3.5–5.2)
ALP SERPL-CCNC: 55 U/L (ref 55–135)
ALT SERPL W/O P-5'-P-CCNC: 11 U/L (ref 10–44)
ANION GAP SERPL CALC-SCNC: 9 MMOL/L (ref 8–16)
AST SERPL-CCNC: 15 U/L (ref 10–40)
BASOPHILS # BLD AUTO: 0.02 K/UL (ref 0–0.2)
BASOPHILS NFR BLD: 0.4 % (ref 0–1.9)
BILIRUB SERPL-MCNC: 0.6 MG/DL (ref 0.1–1)
BUN SERPL-MCNC: 11 MG/DL (ref 6–20)
CALCIUM SERPL-MCNC: 9.6 MG/DL (ref 8.7–10.5)
CHLORIDE SERPL-SCNC: 112 MMOL/L (ref 95–110)
CHOLEST SERPL-MCNC: 142 MG/DL (ref 120–199)
CHOLEST/HDLC SERPL: 3.1 {RATIO} (ref 2–5)
CO2 SERPL-SCNC: 27 MMOL/L (ref 23–29)
CREAT SERPL-MCNC: 0.8 MG/DL (ref 0.5–1.4)
DIFFERENTIAL METHOD: ABNORMAL
EOSINOPHIL # BLD AUTO: 0 K/UL (ref 0–0.5)
EOSINOPHIL NFR BLD: 0.8 % (ref 0–8)
ERYTHROCYTE [DISTWIDTH] IN BLOOD BY AUTOMATED COUNT: 12.6 % (ref 11.5–14.5)
EST. GFR  (NO RACE VARIABLE): >60 ML/MIN/1.73 M^2
ESTIMATED AVG GLUCOSE: 103 MG/DL (ref 68–131)
GLUCOSE SERPL-MCNC: 95 MG/DL (ref 70–110)
HBA1C MFR BLD: 5.2 % (ref 4–5.6)
HCT VFR BLD AUTO: 36.9 % (ref 37–48.5)
HDLC SERPL-MCNC: 46 MG/DL (ref 40–75)
HDLC SERPL: 32.4 % (ref 20–50)
HGB BLD-MCNC: 11.9 G/DL (ref 12–16)
IMM GRANULOCYTES # BLD AUTO: 0 K/UL (ref 0–0.04)
IMM GRANULOCYTES NFR BLD AUTO: 0 % (ref 0–0.5)
LDLC SERPL CALC-MCNC: 85.6 MG/DL (ref 63–159)
LYMPHOCYTES # BLD AUTO: 1.9 K/UL (ref 1–4.8)
LYMPHOCYTES NFR BLD: 39.3 % (ref 18–48)
MCH RBC QN AUTO: 29.2 PG (ref 27–31)
MCHC RBC AUTO-ENTMCNC: 32.2 G/DL (ref 32–36)
MCV RBC AUTO: 90 FL (ref 82–98)
MONOCYTES # BLD AUTO: 0.3 K/UL (ref 0.3–1)
MONOCYTES NFR BLD: 6.1 % (ref 4–15)
NEUTROPHILS # BLD AUTO: 2.6 K/UL (ref 1.8–7.7)
NEUTROPHILS NFR BLD: 53.4 % (ref 38–73)
NONHDLC SERPL-MCNC: 96 MG/DL
NRBC BLD-RTO: 0 /100 WBC
PLATELET # BLD AUTO: 121 K/UL (ref 150–450)
PMV BLD AUTO: 10.8 FL (ref 9.2–12.9)
POTASSIUM SERPL-SCNC: 4 MMOL/L (ref 3.5–5.1)
PROT SERPL-MCNC: 6.8 G/DL (ref 6–8.4)
RBC # BLD AUTO: 4.08 M/UL (ref 4–5.4)
SODIUM SERPL-SCNC: 148 MMOL/L (ref 136–145)
TRIGL SERPL-MCNC: 52 MG/DL (ref 30–150)
TSH SERPL DL<=0.005 MIU/L-ACNC: 1.01 UIU/ML (ref 0.4–4)
WBC # BLD AUTO: 4.89 K/UL (ref 3.9–12.7)

## 2023-09-19 PROCEDURE — 80061 LIPID PANEL: CPT | Performed by: INTERNAL MEDICINE

## 2023-09-19 PROCEDURE — 63600175 PHARM REV CODE 636 W HCPCS

## 2023-09-19 PROCEDURE — 99999 PR PBB SHADOW E&M-EST. PATIENT-LVL III: CPT | Mod: PBBFAC,,, | Performed by: INTERNAL MEDICINE

## 2023-09-19 PROCEDURE — 83036 HEMOGLOBIN GLYCOSYLATED A1C: CPT | Performed by: INTERNAL MEDICINE

## 2023-09-19 PROCEDURE — 96372 THER/PROPH/DIAG INJ SC/IM: CPT

## 2023-09-19 PROCEDURE — 25000003 PHARM REV CODE 250

## 2023-09-19 PROCEDURE — 90471 IMMUNIZATION ADMIN: CPT | Mod: S$GLB,,, | Performed by: INTERNAL MEDICINE

## 2023-09-19 PROCEDURE — 99999 PR PBB SHADOW E&M-EST. PATIENT-LVL III: ICD-10-PCS | Mod: PBBFAC,,, | Performed by: INTERNAL MEDICINE

## 2023-09-19 PROCEDURE — 85025 COMPLETE CBC W/AUTO DIFF WBC: CPT | Performed by: INTERNAL MEDICINE

## 2023-09-19 PROCEDURE — 90471 FLU VACCINE (QUAD) GREATER THAN OR EQUAL TO 3YO PRESERVATIVE FREE IM: ICD-10-PCS | Mod: S$GLB,,, | Performed by: INTERNAL MEDICINE

## 2023-09-19 PROCEDURE — 80053 COMPREHEN METABOLIC PANEL: CPT | Performed by: INTERNAL MEDICINE

## 2023-09-19 PROCEDURE — 90686 FLU VACCINE (QUAD) GREATER THAN OR EQUAL TO 3YO PRESERVATIVE FREE IM: ICD-10-PCS | Mod: S$GLB,,, | Performed by: INTERNAL MEDICINE

## 2023-09-19 PROCEDURE — 99395 PR PREVENTIVE VISIT,EST,18-39: ICD-10-PCS | Mod: 25,S$GLB,, | Performed by: INTERNAL MEDICINE

## 2023-09-19 PROCEDURE — 99395 PREV VISIT EST AGE 18-39: CPT | Mod: 25,S$GLB,, | Performed by: INTERNAL MEDICINE

## 2023-09-19 PROCEDURE — 84443 ASSAY THYROID STIM HORMONE: CPT | Performed by: INTERNAL MEDICINE

## 2023-09-19 PROCEDURE — 90686 IIV4 VACC NO PRSV 0.5 ML IM: CPT | Mod: S$GLB,,, | Performed by: INTERNAL MEDICINE

## 2023-09-19 PROCEDURE — 36415 COLL VENOUS BLD VENIPUNCTURE: CPT | Performed by: INTERNAL MEDICINE

## 2023-09-19 RX ORDER — PROCHLORPERAZINE MALEATE 10 MG
10 TABLET ORAL DAILY PRN
Qty: 30 TABLET | Refills: 1 | Status: SHIPPED | OUTPATIENT
Start: 2023-09-19

## 2023-09-19 RX ORDER — ONDANSETRON HYDROCHLORIDE 8 MG/1
8 TABLET, FILM COATED ORAL EVERY 8 HOURS PRN
Qty: 21 TABLET | Refills: 1 | Status: SHIPPED | OUTPATIENT
Start: 2023-09-19

## 2023-09-19 RX ORDER — TOPIRAMATE 50 MG/1
50 TABLET, FILM COATED ORAL 2 TIMES DAILY
Qty: 60 TABLET | Refills: 11 | Status: SHIPPED | OUTPATIENT
Start: 2023-09-19 | End: 2024-09-18

## 2023-09-19 RX ORDER — NABUMETONE 500 MG/1
TABLET, FILM COATED ORAL
COMMUNITY
Start: 2023-03-14 | End: 2023-09-19 | Stop reason: SDUPTHER

## 2023-09-19 RX ORDER — RIMEGEPANT SULFATE 75 MG/75MG
TABLET, ORALLY DISINTEGRATING ORAL
COMMUNITY

## 2023-09-19 RX ORDER — OXYCODONE AND ACETAMINOPHEN 7.5; 325 MG/1; MG/1
1 TABLET ORAL DAILY PRN
Qty: 21 TABLET | Refills: 0 | Status: SHIPPED | OUTPATIENT
Start: 2023-09-19

## 2023-09-19 RX ORDER — NABUMETONE 500 MG/1
500 TABLET, FILM COATED ORAL DAILY PRN
Qty: 30 TABLET | Refills: 1 | Status: SHIPPED | OUTPATIENT
Start: 2023-09-19

## 2023-09-19 RX ORDER — TIZANIDINE 4 MG/1
4 TABLET ORAL DAILY PRN
Qty: 30 TABLET | Refills: 1 | Status: SHIPPED | OUTPATIENT
Start: 2023-09-19 | End: 2023-10-13

## 2023-09-19 RX ADMIN — DIPHENHYDRAMINE HYDROCHLORIDE 25 MG: 50 INJECTION, SOLUTION INTRAMUSCULAR; INTRAVENOUS at 12:09

## 2023-09-19 RX ADMIN — KETOROLAC TROMETHAMINE 10 MG: 30 INJECTION, SOLUTION INTRAMUSCULAR; INTRAVENOUS at 12:09

## 2023-09-19 RX ADMIN — SODIUM CHLORIDE 1000 ML: 9 INJECTION, SOLUTION INTRAVENOUS at 12:09

## 2023-09-19 RX ADMIN — PROCHLORPERAZINE EDISYLATE 10 MG: 5 INJECTION INTRAMUSCULAR; INTRAVENOUS at 12:09
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
## 2023-09-19 NOTE — ED NOTES
Assumed care of pt; received report from PRIMO Hill.    The patient is resting quietly in ED stretcher, and is AAOx4 at this time. Respirations are even and unlabored, with no distress noted. The patient has had all questions and concerns addressed at this time. The patient was offered restroom assistance and denies need to void. The patient denies further needs and has no complaints at this time. SR raised x2, bed locked and in low position with brake engaged. Call bell within reach and the patient verbalized she would call for assistance if needed. Personal belongings are at bedside within reach.

## 2023-09-19 NOTE — DISCHARGE INSTRUCTIONS
PLEASE FOLLOW UP WITH YOUR NEUROLOGIST AND/OR PCP REGARDING TODAY'S VISIT    Please return to the ED if you experience:   - Continued or worsening migraine symptoms  - Significant vision or hearing changes  - Signs of infections such as fever or chills  - Weakness or sensory changes  - Difficulty speaking or slurred speech  - Any other features you find overtly concerning

## 2023-09-19 NOTE — ED NOTES
Bed: [facility]  Expected date:   Expected time:   Means of arrival:   Comments:  [facility]

## 2023-09-19 NOTE — ED PROVIDER NOTES
Encounter Date: 9/18/2023       History     Chief Complaint   Patient presents with    Headache     Pt comes to the ED with complaints of migraine that started last night.  Pt has hx of migraines.  Pt endorses blurred vision, nausea, and vomiting.       SHABANA Saab is a 18-year-old female with a past medical history significant for migraines, which are treated with Imitrex, Ubrelvy, Elavil.  She presented to the emergency department today for a migraine consistent with her previous migraines.  She described these symptoms as a diffuse headache which waxes and wanes in character accompanied by blurred vision and photophobia, mild neck stiffness.  She states that the onset of this headache was approximately 7:00 p.m. yesterday.  She does endorse 1 episode of emesis yesterday, but has experienced no nausea or vomiting since.  She denies fevers, chills, hearing changes, confusion, meningismus, chest pain, shortness of breath, cough, congestion, abdominal pain, bladder or bowel incontinence.  She has tried to take her typical migraine medications to alleviate symptoms, but has had minimal relief.      Review of patient's allergies indicates:  No Known Allergies  Past Medical History:   Diagnosis Date    Migraine headache     Post concussive syndrome      Past Surgical History:   Procedure Laterality Date    WISDOM TOOTH EXTRACTION       Family History   Problem Relation Age of Onset    Diabetes Father     Lupus Father     Multiple sclerosis Maternal Aunt     Heart disease Maternal Grandmother     Diabetes Maternal Grandmother     Multiple sclerosis Maternal Grandmother     Heart disease Paternal Grandmother     Diabetes Paternal Grandmother      Social History     Tobacco Use    Smoking status: Never    Smokeless tobacco: Never   Substance Use Topics    Alcohol use: Never    Drug use: Never     Review of Systems    Physical Exam     Initial Vitals [09/18/23 2052]   BP Pulse Resp Temp SpO2   107/72 81 16 98.6 °F (37  °C) 98 %      MAP       --         Physical Exam    Nursing note and vitals reviewed.  Constitutional: She appears well-developed and well-nourished. No distress.   HENT:   Head: Normocephalic and atraumatic.   Eyes: Conjunctivae and EOM are normal. Pupils are equal, round, and reactive to light.   + sensitivity to light   Neck: Neck supple.   + mild neck stiffness (with full ROM intact)   Normal range of motion.  Cardiovascular:  Normal rate, regular rhythm and normal heart sounds.           Pulmonary/Chest: Breath sounds normal.   Abdominal: Abdomen is soft. Bowel sounds are normal.   Musculoskeletal:         General: Normal range of motion.      Cervical back: Normal range of motion and neck supple.     Neurological: She is alert and oriented to person, place, and time. She has normal strength. No sensory deficit.   Skin: Skin is warm and dry. Capillary refill takes less than 2 seconds. No rash noted.   Psychiatric: She has a normal mood and affect. Her behavior is normal. Judgment and thought content normal.         ED Course   Procedures  Labs Reviewed   POCT URINE PREGNANCY          Imaging Results    None          Medications   diphenhydrAMINE injection 25 mg (25 mg Intravenous Given 9/19/23 0008)   prochlorperazine injection Soln 10 mg (10 mg Intramuscular Given 9/19/23 0009)   sodium chloride 0.9% bolus 1,000 mL 1,000 mL (0 mLs Intravenous Stopped 9/19/23 0124)   ketorolac injection 9.999 mg (9.999 mg Intravenous Given 9/19/23 0006)     Medical Decision Making  Amount and/or Complexity of Data Reviewed  Labs:  Decision-making details documented in ED Course.    Risk  Prescription drug management.    Patient is a 18-year-old female who presents to the emergency department today for migraines.  She states that these migraines are consistent with her typical migraines, which are often accompanied by vision blurring, photophobia, neck stiffness, occasional episodes of emesis.  She is prescribed medications for  her migraines, managed by her neurologist.  Laboratory testing during today's visit included a point of care pregnancy test, which was negative.  Patient remained vital stable throughout visit.  No additional laboratory or imaging tests are warranted at this time.  ED interventions included a 1 L bolus of normal saline, Compazine, Benadryl, Toradol; these interventions resulted in the alleviation of patient's symptoms.    Given patient's history, exam, and response to treatment presentation her current symptoms are most likely related to an occurrence of her typical migraine. There is minimal concern for infectious process, seizure, trauma, or other more significant etiology.     Disposition:  Home with recommendation for neurology follow-up and strict return precautions.  Patient endorsed understanding and agreement with this plan.           ED Course as of 09/19/23 0847   Mon Sep 18, 2023   2334 Preg Test, Ur: Negative [RN]   2334 BP: 107/72 [RN]   2335 Temp: 98.6 °F (37 °C) [RN]   Tue Sep 19, 2023   0132 Compazine, benadryl, Toradol given. Symptoms resolved. Patient discharged home with care instructions and return precautions. Will f/u with PCP and neurology for further management.  [RN]      ED Course User Index  [RN] Lj Kessler MD                      Clinical Impression:   Final diagnoses:  [G43.109] Migraine with aura and without status migrainosus, not intractable (Primary)        ED Disposition Condition    Discharge Stable          ED Prescriptions    None       Follow-up Information       Follow up With Specialties Details Why Contact Info    Alysia Curtis MD Internal Medicine Schedule an appointment as soon as possible for a visit  As needed, If symptoms worsen [address] [facility] [id number]  [phone]               Lj Kessler MD  Resident  09/19/23 3480

## 2023-09-19 NOTE — ED TRIAGE NOTES
Pt. Is a 18 yr old  female presenting t the ED with migraine that started at 1800 yesterday.1 x episode of vomiting..  Hx: of migraines.

## 2023-09-19 NOTE — FIRST PROVIDER EVALUATION
Medical screening examination initiated.  I have conducted a focused provider triage encounter, findings are as follows:    Brief history of present illness:  migraine, h/o same, no fever or aura    There were no vitals filed for this visit.    Pertinent physical exam:  ambulatory, nonfocal    Brief workup plan:  intake    Preliminary workup initiated; this workup will be continued and followed by the physician or advanced practice provider that is assigned to the patient when roomed.

## 2023-10-03 ENCOUNTER — PATIENT MESSAGE (OUTPATIENT)
Dept: OBSTETRICS AND GYNECOLOGY | Facility: CLINIC | Age: 18
End: 2023-10-03
Payer: COMMERCIAL

## 2023-10-03 DIAGNOSIS — G43.111 INTRACTABLE MIGRAINE WITH AURA WITH STATUS MIGRAINOSUS: ICD-10-CM

## 2023-10-03 DIAGNOSIS — Z97.5 BREAKTHROUGH BLEEDING WITH IUD: Primary | ICD-10-CM

## 2023-10-03 DIAGNOSIS — N92.1 BREAKTHROUGH BLEEDING WITH IUD: Primary | ICD-10-CM

## 2023-10-03 RX ORDER — ESTRADIOL 2 MG/1
2 TABLET ORAL DAILY
Qty: 7 TABLET | Refills: 0 | Status: SHIPPED | OUTPATIENT
Start: 2023-10-03 | End: 2023-10-10

## 2023-10-03 RX ORDER — RIZATRIPTAN BENZOATE 10 MG/1
10 TABLET ORAL
Qty: 9 TABLET | Refills: 2 | Status: SHIPPED | OUTPATIENT
Start: 2023-10-03 | End: 2024-01-11

## 2023-10-03 NOTE — TELEPHONE ENCOUNTER
No care due was identified.  [facility] Embedded Care Due Messages. Reference number: [id number].   10/03/2023 1:50:25 PM CDT

## 2023-11-17 RX ORDER — AMOXICILLIN 875 MG/1
875 TABLET, FILM COATED ORAL EVERY 12 HOURS
Qty: 14 TABLET | Refills: 0 | Status: SHIPPED | OUTPATIENT
Start: 2023-11-17

## 2023-11-17 RX ORDER — PREDNISONE 20 MG/1
40 TABLET ORAL DAILY
Qty: 10 TABLET | Refills: 0 | Status: SHIPPED | OUTPATIENT
Start: 2023-11-17 | End: 2023-11-20

## 2023-12-11 ENCOUNTER — TELEPHONE (OUTPATIENT)
Dept: INTERNAL MEDICINE | Facility: CLINIC | Age: 18
End: 2023-12-11
Payer: COMMERCIAL

## 2023-12-11 RX ORDER — FLUCONAZOLE 200 MG/1
200 TABLET ORAL DAILY
Qty: 1 TABLET | Refills: 0 | Status: SHIPPED | OUTPATIENT
Start: 2023-12-11 | End: 2024-01-10

## 2023-12-11 RX ORDER — PREDNISONE 20 MG/1
TABLET ORAL
Qty: 6 TABLET | Refills: 0 | Status: SHIPPED | OUTPATIENT
Start: 2023-12-11

## 2023-12-11 RX ORDER — AMOXICILLIN AND CLAVULANATE POTASSIUM 875; 125 MG/1; MG/1
1 TABLET, FILM COATED ORAL EVERY 12 HOURS
Qty: 14 TABLET | Refills: 0 | Status: SHIPPED | OUTPATIENT
Start: 2023-12-11 | End: 2023-12-18

## 2024-01-11 DIAGNOSIS — G43.111 INTRACTABLE MIGRAINE WITH AURA WITH STATUS MIGRAINOSUS: ICD-10-CM

## 2024-01-11 RX ORDER — RIZATRIPTAN BENZOATE 10 MG/1
TABLET ORAL
Qty: 27 TABLET | Refills: 2 | Status: SHIPPED | OUTPATIENT
Start: 2024-01-11

## 2024-01-11 NOTE — TELEPHONE ENCOUNTER
No care due was identified.  [facility] Embedded Care Due Messages. Reference number: [id number].   1/11/2024 12:25:15 AM CST

## 2024-01-11 NOTE — TELEPHONE ENCOUNTER
Refill Routing Note   Medication(s) are not appropriate for processing by [facility] for the following reason(s):        Drug-drug interaction    [facility] action(s):  Defer        Medication Therapy Plan: Duplicate Therapy: sumatriptan, rizatriptan, UBRELVY, NURTEC    Pharmacist review requested: Yes     Appointments  past 12m or future 3m with PCP    Date Provider   Last Visit   9/19/2023 Alysia Curtis MD   Next Visit   Visit date not found Alysia Curtis MD   ED visits in past 90 days: 0        Note composed:10:04 AM 01/11/2024

## 2024-01-11 NOTE — TELEPHONE ENCOUNTER
Refill Decision Note   Zarina Saab  is requesting a refill authorization.  Brief Assessment and Rationale for Refill:  Approve     Medication Therapy Plan:         Pharmacist review requested: Yes   Extended chart review required: Yes   Comments:     Note composed:3:55 PM 01/11/2024

## 2025-02-17 ENCOUNTER — PATIENT MESSAGE (OUTPATIENT)
Dept: ORTHOPEDICS | Facility: CLINIC | Age: 20
End: 2025-02-17
Payer: COMMERCIAL

## 2025-02-17 ENCOUNTER — TELEPHONE (OUTPATIENT)
Dept: ORTHOPEDICS | Facility: CLINIC | Age: 20
End: 2025-02-17
Payer: COMMERCIAL

## 2025-02-17 DIAGNOSIS — M25.532 LEFT WRIST PAIN: Primary | ICD-10-CM

## 2025-02-17 NOTE — TELEPHONE ENCOUNTER
LVM    Notified patient to stop at [facility] Location - 1st Floor [address] [facility], Please park in [address] and use [place] elevators 30 minutes prior to your appointment time for X-ray. After your X-ray please proceed to 9th Floor [address] for Appointment on 2/19/2025 with Whintey Da Silva PA-C for x-rays.     Leta Waters MA  [facility]  P: [phone]  F: [phone]

## 2025-02-17 NOTE — TELEPHONE ENCOUNTER
Called pt to verify insurance as we have none on file. Stated that in order for her appointment to be processed, we must have insurance information: Insurance provider, Plan type, Member ID, Group Number.     Pt requested I send her a message in Drop Messages with what info we need. Pt stated she would respond with all necessary info when she got home as she was driving when I called.    Leta Waters MA  [facility]  P: [phone]  F: [phone]

## 2025-02-19 ENCOUNTER — OFFICE VISIT (OUTPATIENT)
Dept: ORTHOPEDICS | Facility: CLINIC | Age: 20
End: 2025-02-19
Payer: COMMERCIAL

## 2025-02-19 ENCOUNTER — HOSPITAL ENCOUNTER (EMERGENCY)
Facility: OTHER | Age: 20
Discharge: HOME | End: 2025-02-20
Attending: EMERGENCY MEDICINE
Payer: COMMERCIAL

## 2025-02-19 ENCOUNTER — HOSPITAL ENCOUNTER (OUTPATIENT)
Dept: RADIOLOGY | Facility: OTHER | Age: 20
Discharge: HOME | End: 2025-02-19
Payer: COMMERCIAL

## 2025-02-19 DIAGNOSIS — M25.539 PAIN OF ULNAR SIDE OF WRIST: ICD-10-CM

## 2025-02-19 DIAGNOSIS — M25.532 LEFT WRIST PAIN: ICD-10-CM

## 2025-02-19 DIAGNOSIS — M25.532 CHRONIC PAIN OF LEFT WRIST: ICD-10-CM

## 2025-02-19 DIAGNOSIS — M77.8 LEFT WRIST TENDINITIS: Primary | ICD-10-CM

## 2025-02-19 DIAGNOSIS — G89.29 CHRONIC PAIN OF LEFT WRIST: ICD-10-CM
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
## 2025-02-19 DIAGNOSIS — T14.8XXA ANIMAL BITE: Primary | ICD-10-CM

## 2025-02-19 PROCEDURE — 90471 IMMUNIZATION ADMIN: CPT | Performed by: NURSE PRACTITIONER

## 2025-02-19 PROCEDURE — 1160F RVW MEDS BY RX/DR IN RCRD: CPT | Mod: CPTII,S$GLB,,

## 2025-02-19 PROCEDURE — 1159F MED LIST DOCD IN RCRD: CPT | Mod: CPTII,S$GLB,,

## 2025-02-19 PROCEDURE — 73110 X-RAY EXAM OF WRIST: CPT | Mod: TC,FY,LT

## 2025-02-19 PROCEDURE — 63600175 PHARM REV CODE 636 W HCPCS: Performed by: NURSE PRACTITIONER

## 2025-02-19 PROCEDURE — 99283 EMERGENCY DEPT VISIT LOW MDM: CPT | Mod: 25

## 2025-02-19 PROCEDURE — 90715 TDAP VACCINE 7 YRS/> IM: CPT | Performed by: NURSE PRACTITIONER

## 2025-02-19 RX ORDER — AMOXICILLIN AND CLAVULANATE POTASSIUM 875; 125 MG/1; MG/1
1 TABLET, FILM COATED ORAL
Status: COMPLETED | OUTPATIENT
Start: 2025-02-20 | End: 2025-02-20

## 2025-02-19 RX ADMIN — CLOSTRIDIUM TETANI TOXOID ANTIGEN (FORMALDEHYDE INACTIVATED), CORYNEBACTERIUM DIPHTHERIAE TOXOID ANTIGEN (FORMALDEHYDE INACTIVATED), BORDETELLA PERTUSSIS TOXOID ANTIGEN (GLUTARALDEHYDE INACTIVATED), BORDETELLA PERTUSSIS FILAMENTOUS HEMAGGLUTININ ANTIGEN (FORMALDEHYDE INACTIVATED), BORDETELLA PERTUSSIS PERTACTIN ANTIGEN, AND BORDETELLA PERTUSSIS FIMBRIAE 2/3 ANTIGEN 0.5 ML: 5; 2; 2.5; 5; 3; 5 INJECTION, SUSPENSION INTRAMUSCULAR at 11:02
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
## 2025-02-20 VITALS
OXYGEN SATURATION: 99 % | HEIGHT: 65 IN | DIASTOLIC BLOOD PRESSURE: 71 MMHG | TEMPERATURE: 98 F | SYSTOLIC BLOOD PRESSURE: 108 MMHG | RESPIRATION RATE: 18 BRPM | BODY MASS INDEX: 20.83 KG/M2 | WEIGHT: 125 LBS | HEART RATE: 94 BPM

## 2025-02-20 PROCEDURE — 25000003 PHARM REV CODE 250: Performed by: EMERGENCY MEDICINE

## 2025-02-20 RX ORDER — IBUPROFEN 600 MG/1
600 TABLET ORAL
Status: COMPLETED | OUTPATIENT
Start: 2025-02-20 | End: 2025-02-20

## 2025-02-20 RX ORDER — IBUPROFEN 600 MG/1
600 TABLET ORAL EVERY 6 HOURS PRN
Qty: 20 TABLET | Refills: 0 | Status: SHIPPED | OUTPATIENT
Start: 2025-02-20

## 2025-02-20 RX ORDER — AMOXICILLIN AND CLAVULANATE POTASSIUM 875; 125 MG/1; MG/1
1 TABLET, FILM COATED ORAL 2 TIMES DAILY
Qty: 14 TABLET | Refills: 0 | Status: SHIPPED | OUTPATIENT
Start: 2025-02-20

## 2025-02-20 RX ADMIN — IBUPROFEN 600 MG: 600 TABLET, FILM COATED ORAL at 12:02

## 2025-02-20 RX ADMIN — AMOXICILLIN AND CLAVULANATE POTASSIUM 1 TABLET: 875; 125 TABLET, FILM COATED ORAL at 12:02

## 2025-02-20 NOTE — ED PROVIDER NOTES
Encounter Date: 2/19/2025       History     Chief Complaint   Patient presents with    Animal Bite     Reports dog bite R palm. Noted 2cm long lac to middle of palm. Bleeding controlled/wrapped with gauze. Pt states she is up to date on tetanus. States it was not her dog who bit her.      Provoked attack from dog familiar to its owners      Animal Bite   The incident occurred 1 to 2 hours ago. The incident occurred in the street. There is an injury to the Right hand. It is unlikely that a foreign body is present. Pertinent negatives include no numbness, no focal weakness and no weakness. There have been no prior injuries to these areas. Her tetanus status is out of date.     Review of patient's allergies indicates:  No Known Allergies  Past Medical History:   Diagnosis Date    Migraine headache     Post concussive syndrome      Past Surgical History:   Procedure Laterality Date    WISDOM TOOTH EXTRACTION       Family History   Problem Relation Name Age of Onset    Diabetes Father      Lupus Father      Multiple sclerosis Maternal Aunt      Heart disease Maternal Grandmother      Diabetes Maternal Grandmother      Multiple sclerosis Maternal Grandmother      Heart disease Paternal Grandmother      Diabetes Paternal Grandmother       Social History[1]  Review of Systems   Constitutional:  Negative for fever.   Skin:  Positive for wound.   Neurological:  Negative for focal weakness, weakness and numbness.   All other systems reviewed and are negative.      Physical Exam     Initial Vitals [02/19/25 2004]   BP Pulse Resp Temp SpO2   126/77 104 16 98 °F (36.7 °C) 98 %      MAP       --         Physical Exam    Nursing note and vitals reviewed.  Constitutional: She appears well-developed and well-nourished. She is not diaphoretic. No distress.   HENT:   Head: Normocephalic and atraumatic.   Eyes: Conjunctivae and EOM are normal.   Neck: Neck supple.   Normal range of motion.  Cardiovascular:  Normal rate, regular rhythm  and intact distal pulses.           Pulmonary/Chest: No respiratory distress.   Musculoskeletal:         General: No edema. Normal range of motion.      Cervical back: Normal range of motion and neck supple.      Comments: 1 cm bite wound to palmar surface of right hand centrally, slight bruising of the thenar eminence  Superficial digital abrasion associated hand  Full strength with flexion and sensation intact     Neurological: She is alert and oriented to person, place, and time. She has normal strength.   Skin: Skin is warm and dry.         ED Course   Procedures  Labs Reviewed - No data to display       Imaging Results              X-Ray Hand 3 view Right (Final result)  Result time 02/19/25 21:42:00      Final result by Cesar Floyd MD (02/19/25 21:42:00)                   Impression:      No acute osseous abnormality identified.      Electronically signed by: Cesar Floyd MD  Date:    02/19/2025  Time:    21:42               Narrative:    EXAMINATION:  XR HAND COMPLETE 3 VIEW RIGHT    CLINICAL HISTORY:  Dog bite;    TECHNIQUE:  PA, lateral, and oblique views of the right hand were performed.    COMPARISON:  None    FINDINGS:  No evidence of acute displaced fracture, dislocation, or osseous destructive process.  There is negative ulnar variance.  No radiopaque retained foreign body seen.                                       Medications   Tdap vaccine injection 0.5 mL (0.5 mLs Intramuscular Given 2/19/25 2911)   amoxicillin-clavulanate 875-125mg per tablet 1 tablet (1 tablet Oral Given 2/20/25 0016)   ibuprofen tablet 600 mg (600 mg Oral Given 2/20/25 0016)     Medical Decision Making  Injury is not suggestive of flexor tendon laceration with full strength and sensation on exam  Imaging without retained foreign body or fracture  Wounds irrigated copiously  Not amenable to suturing given location and puncture wound from dog bite  Wounds dressed  Tetanus updated  Augmentin  Bulky hand for wound  protection and comfort  Discussed potential for infection despite antibiotics and wound cleansing, will need close follow-up for wound check  Discussed potential complications including need for hand consultation, ED return precautions  Patient comfortable with plan of care      Risk  Prescription drug management.                                      Clinical Impression:  Final diagnoses:  [T14.8XXA] Animal bite (Primary)  [W54.0XXA] Dog bite, initial encounter          ED Disposition Condition    Discharge Stable          ED Prescriptions       Medication Sig Dispense Start Date End Date Auth. Provider    amoxicillin-clavulanate 875-125mg (AUGMENTIN) 875-125 mg per tablet Take 1 tablet by mouth 2 (two) times daily. 14 tablet 2/20/2025 -- Lefort, Guy J., MD    ibuprofen (ADVIL,MOTRIN) 600 MG tablet Take 1 tablet (600 mg total) by mouth every 6 (six) hours as needed for Pain. 20 tablet 2/20/2025 -- Lefort, Guy J., MD          Follow-up Information       Follow up With Specialties Details Why Contact Info    [facility] - Emergency Dept Emergency Medicine  If symptoms worsen or any other concerns [id number] [facility] [id number] [phone]               [1]   Social History  Tobacco Use    Smoking status: Never    Smokeless tobacco: Never   Substance Use Topics    Alcohol use: Never    Drug use: Never        Lefort, Guy J., MD  02/20/25 0600

## 2025-02-20 NOTE — FIRST PROVIDER EVALUATION
Emergency Department TeleTriage Encounter Note      CHIEF COMPLAINT    Chief Complaint   Patient presents with    Animal Bite     Reports dog bite R palm. Noted 2cm long lac to middle of palm. Bleeding controlled/wrapped with gauze. Pt states she is up to date on tetanus. States it was not her dog who bit her.        VITAL SIGNS   Initial Vitals [02/19/25 2004]   BP Pulse Resp Temp SpO2   126/77 104 16 98 °F (36.7 °C) 98 %      MAP       --            ALLERGIES    Review of patient's allergies indicates:  No Known Allergies    PROVIDER TRIAGE NOTE  19-year-old female bitten by a domesticated dog on the right hand.  Tetanus vaccine not up-to-date.  Vitals normal.      ORDERS  Labs Reviewed - No data to display    ED Orders (720h ago, onward)      Start Ordered     Status Ordering Provider    02/19/25 2206 02/19/25 2106  Tdap vaccine injection 0.5 mL  vaccine x 1 dose         Acknowledged CARLOS HELLER              Virtual Visit Note: The provider triage portion of this emergency department evaluation and documentation was performed via [employer], a HIPAA-compliant telemedicine application, in concert with a tele-presenter in the room. A face to face patient evaluation with one of my colleagues will occur once the patient is placed in an emergency department room.      DISCLAIMER: This note was prepared with [employer]ingu[employer] voice recognition transcription software. Garbled syntax, mangled pronouns, and other bizarre constructions may be attributed to that software system.